# Patient Record
Sex: MALE | Race: WHITE | NOT HISPANIC OR LATINO | Employment: OTHER
[De-identification: names, ages, dates, MRNs, and addresses within clinical notes are randomized per-mention and may not be internally consistent; named-entity substitution may affect disease eponyms.]

---

## 2021-06-21 ENCOUNTER — TRANSCRIBE ORDERS (OUTPATIENT)
Dept: OTHER | Age: 71
End: 2021-06-21

## 2021-06-21 DIAGNOSIS — H02.839 DERMATOCHALASIS OF UNSPECIFIED EYE, UNSPECIFIED EYELID: Primary | ICD-10-CM

## 2021-06-23 ENCOUNTER — TELEPHONE (OUTPATIENT)
Dept: OPHTHALMOLOGY | Facility: CLINIC | Age: 71
End: 2021-06-23

## 2021-06-23 NOTE — TELEPHONE ENCOUNTER
Patient called regarding rescheduling need due to out of Town trip planned. Rescheduled patient accordingly and sent new appointment letter to patient.-Per Patient

## 2021-06-23 NOTE — TELEPHONE ENCOUNTER
"Spoke with patient regarding [facility] Ref[facility] to [facility]culoplastics for \"blepharoplasty each eye, visually significant dermatochalasis OU\" -Referred by Shantel Madrigal at [facility]   Phone: [phone] Fax: [phone]. Scheduled [facility] in [facility] accordingly and sent appointment letter to confirmed address.-Per Patient   "

## 2021-09-22 ENCOUNTER — OFFICE VISIT (OUTPATIENT)
Dept: OPHTHALMOLOGY | Facility: CLINIC | Age: 71
End: 2021-09-22
Attending: PHYSICIAN ASSISTANT
Payer: COMMERCIAL

## 2021-09-22 DIAGNOSIS — H02.834 DERMATOCHALASIS OF BOTH UPPER EYELIDS: Primary | ICD-10-CM

## 2021-09-22 DIAGNOSIS — H02.831 DERMATOCHALASIS OF BOTH UPPER EYELIDS: Primary | ICD-10-CM

## 2021-09-22 DIAGNOSIS — H57.813 BROW PTOSIS, BILATERAL: ICD-10-CM

## 2021-09-22 PROCEDURE — 99204 OFFICE O/P NEW MOD 45 MIN: CPT | Performed by: OPHTHALMOLOGY

## 2021-09-22 PROCEDURE — 92285 EXTERNAL OCULAR PHOTOGRAPHY: CPT | Performed by: OPHTHALMOLOGY

## 2021-09-22 PROCEDURE — 92081 LIMITED VISUAL FIELD XM: CPT | Performed by: OPHTHALMOLOGY

## 2021-09-22 RX ORDER — PREDNISONE 20 MG/1
20 TABLET ORAL DAILY PRN
COMMUNITY

## 2021-09-22 RX ORDER — LORATADINE 10 MG/1
10 TABLET ORAL EVERY MORNING
COMMUNITY

## 2021-09-22 RX ORDER — CETIRIZINE HYDROCHLORIDE 10 MG/1
10 TABLET ORAL EVERY MORNING
COMMUNITY

## 2021-09-22 RX ORDER — GABAPENTIN 300 MG/1
600 CAPSULE ORAL AT BEDTIME
COMMUNITY

## 2021-09-22 RX ORDER — LEVOTHYROXINE SODIUM 75 UG/1
75 TABLET ORAL EVERY MORNING
COMMUNITY

## 2021-09-22 RX ORDER — ATORVASTATIN CALCIUM 80 MG/1
40 TABLET, FILM COATED ORAL EVERY EVENING
COMMUNITY

## 2021-09-22 RX ORDER — ACETAMINOPHEN 325 MG/1
325-650 TABLET ORAL
COMMUNITY

## 2021-09-22 ASSESSMENT — EXTERNAL EXAM - RIGHT EYE: OD_EXAM: BROW PTOSIS, WITH FRONTALIS RELAXED, BROW IS BELOW SUPERIOR ORBITAL RIM AND LATERALLY INLINE WITH UPPER LASHES

## 2021-09-22 ASSESSMENT — VISUAL ACUITY
OD_CC: 20/15
CORRECTION_TYPE: GLASSES
METHOD: SNELLEN - LINEAR
OD_CC+: -1
OS_CC: 20/25

## 2021-09-22 ASSESSMENT — TONOMETRY
IOP_METHOD: ICARE
OD_IOP_MMHG: 17
OS_IOP_MMHG: 14

## 2021-09-22 ASSESSMENT — CONF VISUAL FIELD: COMMENTS: TANGENT VISUAL FIELD PERFORMED TODAY.

## 2021-09-22 ASSESSMENT — EXTERNAL EXAM - LEFT EYE: OS_EXAM: BROW PTOSIS, WITH FRONTALIS RELAXED, BROW IS BELOW SUPERIOR ORBITAL RIM AND LATERALLY INLINE WITH UPPER LASHES

## 2021-09-22 ASSESSMENT — SLIT LAMP EXAM - LIDS
COMMENTS: HEAVY DERMATOCHALASIS RESTING ON LASHES, TRUE PTOSIS
COMMENTS: HEAVY DERMATOCHALASIS RESTING ON LASHES, TRUE PTOSIS

## 2021-09-22 NOTE — NURSING NOTE
Chief Complaints and History of Present Illnesses   Patient presents with     Droopy Eye Lid Evaluation       Chief Complaint(s) and History of Present Illness(es)     Droopy Eye Lid Evaluation     Laterality: right upper lid and left upper lid    Associated signs and symptoms: Negative for eye pain and tearing              Comments     Patient referred by [facility] for dermatochalasis consultation. Patient states he has noticed for a while that his lids have been drooping, left eye worse than right eye. At times left eye droops so much it doesn't even seem that his eye is open. Patient needs to raise his brows up at times to improve vision.                     Cortes Garrido, Ophthalmic Assistant

## 2021-09-22 NOTE — LETTER
9/22/2021         RE: Flaco Hickman  [address] [place] [address]        Dear Colleague,    Thank you for referring your patient, Flaco Hickman, to the [facility]. Please see a copy of my visit note below.    Oculoplastic Clinic New Patient    Patient: Flaco Hickman MRN# [id number]   YOB: 1950 Age: 71 year old   Date of Visit: Sep 22, 2021    CC: Droopy eyelids obstructing vision.              HPI:     Chief Complaint(s) and History of Present Illness(es)     Droopy Eye Lid Evaluation     Laterality: right upper lid and left upper lid    Associated signs and symptoms: Negative for eye pain and tearing              Comments     [facility] dermat[facility]alasis   consultation. Patient states he has noticed for a while that his lids have   been drooping, left eye worse than right eye. At times left eye droops so   much it doesn't even seem that his eye is open. Patient needs to raise his   brows up at times to improve vision.              Flaco Hickman is a 71 year old male who has noted gradual onset of droopy eyelids over the past years. The droopy eyelid is interfering with activities of daily living including driving, and reading. The patient denies double vision, variability of the eyelid position, or dry eye symptoms.     EXAM:     MRD1: 1 mm both eyes   Dermatochalasis with excess skin touching eyelashes   Brow ptosis with brow resting below superior orbital rim   Mild true aponeurotic ptosis.     VISUAL FIELD:  Right eye untaped:30 degrees Right eye taped:50 degrees  Left eye untaped:25 degrees Left eye taped:50 degrees    Assessment & Plan     Flaco Hickman is a 71 year old male with the following diagnoses:   1. Dermatochalasis of both upper eyelids    2. Brow ptosis, bilateral         Both upper eyelid blepharoplasty, bilateral direct browplasty.    He lives about  2 hours away but ok coming back for suture removal 2 weeks postop.    Due to significant brow ptosis, blepharoplasty alone would be unsuccesfull in addressing the lateral hooding which is obstructing vision. Blepharoplasty alone would result in sewing very thin eyelid skin to thick sub-brow skin, and even with that, fail to address lateral hooding which is obstructing vision.     Has received Botox for headaches in the past, discussed shouldn't receive within 3 months of surgery to help with planning.     ANTICOAGULATION:    None         PHOTOS DEMONSTRATE:    Significant dermatochalasis with lids resting on eyelashes and obstructing visual axis  Blepharoptosis  Brow ptosis with thicker brow skin and hairs below the lateral superior orbital rim    Attending Physician Attestation: Complete documentation of historical and exam elements from today's encounter can be found in the full encounter summary report (not reduplicated in this progress note). I personally obtained the chief complaint(s) and history of present illness. I confirmed and edited as necessary the review of systems, past medical/surgical history, family history, social history, and examination findings as documented by others; and I examined the patient myself. I personally reviewed the relevant tests, images, and reports as documented above. I formulated and edited as necessary the assessment and plan and discussed the findings and management plan with the patient. Desirae Sanchez MD      Today with Flaco Hickman and his wife Shanel reviewed the indications, risks, benefits, and alternatives of the proposed surgical procedure including, but not limited to, failure obtain the desired result  and need for additional surgery, bleeding, infection, loss of vision, loss of the eye, and the remote possibility of permanent damage to any organ system or death with the use of anesthesia.  I provided multiple opportunities for the questions,  answered all questions to the best of my ability, and confirmed that my answers and my discussion were understood.             Again, thank you for allowing me to participate in the care of your patient.        Sincerely,        Desirae Sanchez MD    Oculoplastic and Orbital Surgery   Department of Ophthalmology and Visual Neurosciences  [facility]

## 2021-09-22 NOTE — PROGRESS NOTES
Oculoplastic Clinic New Patient    Patient: Flaco Hickman MRN# [id number]   YOB: 1950 Age: 71 year old   Date of Visit: Sep 22, 2021    CC: Droopy eyelids obstructing vision.              HPI:     Chief Complaint(s) and History of Present Illness(es)     Droopy Eye Lid Evaluation     Laterality: right upper lid and left upper lid    Associated signs and symptoms: Negative for eye pain and tearing              Comments     [facility]asis   consultation. Patient states he has noticed for a while that his lids have   been drooping, left eye worse than right eye. At times left eye droops so   much it doesn't even seem that his eye is open. Patient needs to raise his   brows up at times to improve vision.              Flaco Hickman is a 71 year old male who has noted gradual onset of droopy eyelids over the past years. The droopy eyelid is interfering with activities of daily living including driving, and reading. The patient denies double vision, variability of the eyelid position, or dry eye symptoms.     EXAM:     MRD1: 1 mm both eyes   Dermatochalasis with excess skin touching eyelashes   Brow ptosis with brow resting below superior orbital rim   Mild true aponeurotic ptosis.     VISUAL FIELD:  Right eye untaped:30 degrees Right eye taped:50 degrees  Left eye untaped:25 degrees Left eye taped:50 degrees    Assessment & Plan     Flaco Hickman is a 71 year old male with the following diagnoses:   1. Dermatochalasis of both upper eyelids    2. Brow ptosis, bilateral         Both upper eyelid blepharoplasty, bilateral direct browplasty.    He lives about 2 hours away but ok coming back for suture removal 2 weeks postop.    Due to significant brow ptosis, blepharoplasty alone would be unsuccesfull in addressing the lateral hooding which is obstructing vision. Blepharoplasty alone would result in sewing very thin eyelid  skin to thick sub-brow skin, and even with that, fail to address lateral hooding which is obstructing vision.     Has received Botox for headaches in the past, discussed shouldn't receive within 3 months of surgery to help with planning.     ANTICOAGULATION:    None         PHOTOS DEMONSTRATE:    Significant dermatochalasis with lids resting on eyelashes and obstructing visual axis  Blepharoptosis  Brow ptosis with thicker brow skin and hairs below the lateral superior orbital rim    Attending Physician Attestation: Complete documentation of historical and exam elements from today's encounter can be found in the full encounter summary report (not reduplicated in this progress note). I personally obtained the chief complaint(s) and history of present illness. I confirmed and edited as necessary the review of systems, past medical/surgical history, family history, social history, and examination findings as documented by others; and I examined the patient myself. I personally reviewed the relevant tests, images, and reports as documented above. I formulated and edited as necessary the assessment and plan and discussed the findings and management plan with the patient. Desirae Sanchez MD      Today with Flaco Hickman and his wife Shanel reviewed the indications, risks, benefits, and alternatives of the proposed surgical procedure including, but not limited to, failure obtain the desired result  and need for additional surgery, bleeding, infection, loss of vision, loss of the eye, and the remote possibility of permanent damage to any organ system or death with the use of anesthesia.  I provided multiple opportunities for the questions, answered all questions to the best of my ability, and confirmed that my answers and my discussion were understood.

## 2021-10-14 DIAGNOSIS — Z11.59 ENCOUNTER FOR SCREENING FOR OTHER VIRAL DISEASES: ICD-10-CM

## 2021-11-12 ENCOUNTER — ANESTHESIA EVENT (OUTPATIENT)
Dept: SURGERY | Facility: AMBULATORY SURGERY CENTER | Age: 71
End: 2021-11-12
Payer: COMMERCIAL

## 2021-11-15 ENCOUNTER — HOSPITAL ENCOUNTER (OUTPATIENT)
Facility: AMBULATORY SURGERY CENTER | Age: 71
End: 2021-11-15
Attending: OPHTHALMOLOGY | Admitting: OPHTHALMOLOGY
Payer: COMMERCIAL

## 2021-11-15 ENCOUNTER — ANESTHESIA (OUTPATIENT)
Dept: SURGERY | Facility: AMBULATORY SURGERY CENTER | Age: 71
End: 2021-11-15
Payer: COMMERCIAL

## 2021-11-15 VITALS
DIASTOLIC BLOOD PRESSURE: 76 MMHG | WEIGHT: 163 LBS | RESPIRATION RATE: 16 BRPM | OXYGEN SATURATION: 95 % | TEMPERATURE: 97.6 F | SYSTOLIC BLOOD PRESSURE: 151 MMHG

## 2021-11-15 DIAGNOSIS — H02.834 DERMATOCHALASIS OF BOTH UPPER EYELIDS: ICD-10-CM

## 2021-11-15 DIAGNOSIS — H57.813 BROW PTOSIS, BILATERAL: ICD-10-CM

## 2021-11-15 DIAGNOSIS — H02.831 DERMATOCHALASIS OF BOTH UPPER EYELIDS: ICD-10-CM

## 2021-11-15 PROCEDURE — 67900 REPAIR BROW DEFECT: CPT | Mod: RT

## 2021-11-15 PROCEDURE — G8907 PT DOC NO EVENTS ON DISCHARG: HCPCS

## 2021-11-15 PROCEDURE — G8918 PT W/O PREOP ORDER IV AB PRO: HCPCS

## 2021-11-15 PROCEDURE — 15823 BLEPHARP UPR EYELID XCSV SKN: CPT | Mod: 50 | Performed by: OPHTHALMOLOGY

## 2021-11-15 PROCEDURE — 15823 BLEPHARP UPR EYELID XCSV SKN: CPT | Mod: E1

## 2021-11-15 PROCEDURE — 67900 REPAIR BROW DEFECT: CPT | Mod: 50 | Performed by: OPHTHALMOLOGY

## 2021-11-15 RX ORDER — MEPERIDINE HYDROCHLORIDE 25 MG/ML
12.5 INJECTION INTRAMUSCULAR; INTRAVENOUS; SUBCUTANEOUS
Status: DISCONTINUED | OUTPATIENT
Start: 2021-11-15 | End: 2021-11-16 | Stop reason: HOSPADM

## 2021-11-15 RX ORDER — LIDOCAINE HYDROCHLORIDE 20 MG/ML
INJECTION, SOLUTION INFILTRATION; PERINEURAL PRN
Status: DISCONTINUED | OUTPATIENT
Start: 2021-11-15 | End: 2021-11-15

## 2021-11-15 RX ORDER — LIDOCAINE 40 MG/G
CREAM TOPICAL
Status: DISCONTINUED | OUTPATIENT
Start: 2021-11-15 | End: 2021-11-16 | Stop reason: HOSPADM

## 2021-11-15 RX ORDER — FENTANYL CITRATE 50 UG/ML
INJECTION, SOLUTION INTRAMUSCULAR; INTRAVENOUS PRN
Status: DISCONTINUED | OUTPATIENT
Start: 2021-11-15 | End: 2021-11-15

## 2021-11-15 RX ORDER — SODIUM CHLORIDE, SODIUM LACTATE, POTASSIUM CHLORIDE, CALCIUM CHLORIDE 600; 310; 30; 20 MG/100ML; MG/100ML; MG/100ML; MG/100ML
INJECTION, SOLUTION INTRAVENOUS CONTINUOUS
Status: DISCONTINUED | OUTPATIENT
Start: 2021-11-15 | End: 2021-11-16 | Stop reason: HOSPADM

## 2021-11-15 RX ORDER — TETRACAINE HYDROCHLORIDE 5 MG/ML
SOLUTION OPHTHALMIC PRN
Status: DISCONTINUED | OUTPATIENT
Start: 2021-11-15 | End: 2021-11-15 | Stop reason: HOSPADM

## 2021-11-15 RX ORDER — BACITRACIN ZINC 500 [USP'U]/G
OINTMENT TOPICAL
Qty: 30 G | Refills: 0 | Status: SHIPPED | OUTPATIENT
Start: 2021-11-15 | End: 2022-01-12

## 2021-11-15 RX ORDER — ONDANSETRON 2 MG/ML
4 INJECTION INTRAMUSCULAR; INTRAVENOUS EVERY 30 MIN PRN
Status: DISCONTINUED | OUTPATIENT
Start: 2021-11-15 | End: 2021-11-16 | Stop reason: HOSPADM

## 2021-11-15 RX ORDER — OXYCODONE HYDROCHLORIDE 5 MG/1
5 TABLET ORAL EVERY 4 HOURS PRN
Status: DISCONTINUED | OUTPATIENT
Start: 2021-11-15 | End: 2021-11-16 | Stop reason: HOSPADM

## 2021-11-15 RX ORDER — ERYTHROMYCIN 5 MG/G
OINTMENT OPHTHALMIC PRN
Status: DISCONTINUED | OUTPATIENT
Start: 2021-11-15 | End: 2021-11-15 | Stop reason: HOSPADM

## 2021-11-15 RX ORDER — FENTANYL CITRATE 50 UG/ML
25 INJECTION, SOLUTION INTRAMUSCULAR; INTRAVENOUS
Status: DISCONTINUED | OUTPATIENT
Start: 2021-11-15 | End: 2021-11-16 | Stop reason: HOSPADM

## 2021-11-15 RX ORDER — FENTANYL CITRATE 50 UG/ML
25 INJECTION, SOLUTION INTRAMUSCULAR; INTRAVENOUS EVERY 5 MIN PRN
Status: DISCONTINUED | OUTPATIENT
Start: 2021-11-15 | End: 2021-11-16 | Stop reason: HOSPADM

## 2021-11-15 RX ORDER — ERYTHROMYCIN 5 MG/G
OINTMENT OPHTHALMIC
Qty: 3.5 G | Refills: 0 | Status: SHIPPED | OUTPATIENT
Start: 2021-11-15 | End: 2022-01-12

## 2021-11-15 RX ORDER — ONDANSETRON 4 MG/1
4 TABLET, ORALLY DISINTEGRATING ORAL EVERY 30 MIN PRN
Status: DISCONTINUED | OUTPATIENT
Start: 2021-11-15 | End: 2021-11-16 | Stop reason: HOSPADM

## 2021-11-15 RX ORDER — ACETAMINOPHEN 325 MG/1
975 TABLET ORAL ONCE
Status: COMPLETED | OUTPATIENT
Start: 2021-11-15 | End: 2021-11-15

## 2021-11-15 RX ORDER — PROPOFOL 10 MG/ML
INJECTION, EMULSION INTRAVENOUS PRN
Status: DISCONTINUED | OUTPATIENT
Start: 2021-11-15 | End: 2021-11-15

## 2021-11-15 RX ADMIN — FENTANYL CITRATE 25 MCG: 50 INJECTION, SOLUTION INTRAMUSCULAR; INTRAVENOUS at 08:14

## 2021-11-15 RX ADMIN — LIDOCAINE HYDROCHLORIDE 60 MG: 20 INJECTION, SOLUTION INFILTRATION; PERINEURAL at 08:19

## 2021-11-15 RX ADMIN — SODIUM CHLORIDE, SODIUM LACTATE, POTASSIUM CHLORIDE, CALCIUM CHLORIDE: 600; 310; 30; 20 INJECTION, SOLUTION INTRAVENOUS at 07:02

## 2021-11-15 RX ADMIN — PROPOFOL 100 MG: 10 INJECTION, EMULSION INTRAVENOUS at 08:19

## 2021-11-15 RX ADMIN — ACETAMINOPHEN 975 MG: 325 TABLET ORAL at 07:00

## 2021-11-15 NOTE — OP NOTE
[facility] Brief Operative Note    Pre-operative diagnosis: Dermatochalasis of both upper eyelids [H02.831, H02.834]  Brow ptosis, bilateral [H57.813]   Post-operative diagnosis Same   Procedure: Procedure(s):  Both upper eyelid blepharoplasty and direct browplasty   Surgeon: Desirae Sanchez    Assistants(s):    Estimated blood loss: Less than 10 mL   Specimens: None   Findings: As expected

## 2021-11-15 NOTE — ANESTHESIA CARE TRANSFER NOTE
Patient: Flaco Hickman    Procedure: Procedure(s):  Both upper eyelid blepharoplasty and direct browplasty       Diagnosis: Dermatochalasis of both upper eyelids [H02.831, H02.834]  Brow ptosis, bilateral [H57.813]  Diagnosis Additional Information: No value filed.    Anesthesia Type:   MAC     Note:    Oropharynx: oropharynx clear of all foreign objects  Level of Consciousness: awake  Oxygen Supplementation: room air    Independent Airway: airway patency satisfactory and stable  Dentition: dentition unchanged  Vital Signs Stable: post-procedure vital signs reviewed and stable  Report to RN Given: handoff report given  Patient transferred to: Phase II  Comments: To Phase II. Report to RN.  VSS Resp status stable.  Handoff Report: Identifed the Patient, Identified the Reponsible Provider, Reviewed the pertinent medical history, Discussed the surgical course, Reviewed Intra-OP anesthesia mangement and issues during anesthesia, Set expectations for post-procedure period and Allowed opportunity for questions and acknowledgement of understanding      Vitals:  Vitals Value Taken Time   BP     Temp     Pulse     Resp     SpO2         Electronically Signed By: CHAVA Marrufo CRNA  November 15, 2021  9:08 AM

## 2021-11-15 NOTE — OP NOTE
Procedure Date: 11/15/2021    PREOPERATIVE DIAGNOSES:     1.  Both upper eyelid dermatochalasis.  2.  Bilateral brow ptosis.    POSTOPERATIVE DIAGNOSES:    1.  Both upper eyelid dermatochalasis.  2.  Bilateral brow ptosis.    PROCEDURE PERFORMED:      1.  Bilateral upper eyelid blepharoplasty, and bilateral brow ptosis repair.    SURGEON:  Desirae Sanchez M.D.    ASSISTANT:  None.    ANESTHESIA:  Monitored anesthesia care with local infiltration of 50:50 mixture of 2% lidocaine with epinephrine and 0.5% Marcaine.    COMPLICATIONS:  None.    ESTIMATED BLOOD LOSS:  3 mL.    INDICATIONS FOR PROCEDURE:  Mr. Dean presented with bilateral upper eyelid dermatochalasis and brow ptosis, resulting in interference with the superior visual field.  This was bothering him with his activities of daily living.  We discussed risks, benefits and alternatives of the proposed procedure and he elected to proceed.    DESCRIPTION OF PROCEDURE:  Mr. Daen was brought to the operating room and placed supine on the operating table.  The lateral extent of the blepharoplasty as well as the degree of brow ptosis repair desired was marked in the preoperative area.  The upper eyelid crease and the ellipse above the brow was marked out and infiltrated with local anesthetic as above.  He was prepped and draped in typical sterile fashion for oculoplastic surgery.  Attention was directed to the right side.  The brow incision was incised with a 15 blade and the forehead skin was excised with tenotomy scissors.  Care was taken to remain just in the subdermal plane.  Hemostasis was obtained with monopolar cautery.  Deep closure was achieved with Monocryl sutures and skin was closed with a running 6-0 dry sterile nylon suture.  Attention was directed to the other side where the same procedure was performed.  Attention was then directed to the right upper eyelid, skin was incised with a 15 blade and skin flap was excised with Emmanule  scissors.  Hemostasis was obtained with monopolar cautery.  The orbicularis oculi muscle and orbital septum was opened nasally and the nasal fat pad was prolapsed and conservatively debulked.  Hemostasis was obtained and the skin was closed with running 6-0 plain gut suture.  Attention was directed to the other side where the same procedure was performed.  He tolerated the procedure well.  Erythromycin ophthalmic ointment was applied and he left the operating room in good condition.    Desirae Sanchez MD        D: 11/15/2021   T: 11/15/2021   MT: MARY    Name:     ROSENDA MILLER  MRN:      [id number]        Account:        [id number]   :      1950           Procedure Date: 11/15/2021     Document: [id number]

## 2021-11-15 NOTE — DISCHARGE INSTRUCTIONS
[facility]   Adult Discharge Orders & Instructions     For 24 hours after surgery    1. Get plenty of rest.  A responsible adult must stay with you for at least 24 hours after you leave the hospital.   2. Do not drive or use heavy equipment.  If you have weakness or tingling, don't drive or use heavy equipment until this feeling goes away.  3. Do not drink alcohol.  4. Avoid strenuous or risky activities.  Ask for help when climbing stairs.   5. You may feel lightheaded.  IF so, sit for a few minutes before standing.  Have someone help you get up.   6. If you have nausea (feel sick to your stomach): Drink only clear liquids such as apple juice, ginger ale, broth or 7-Up.  Rest may also help.  Be sure to drink enough fluids.  Move to a regular diet as you feel able.  7. You may have a slight fever. Call the doctor if your fever is over 100 F (37.7 C) (taken under the tongue) or lasts longer than 24 hours.  8. You may have a dry mouth, a sore throat, muscle aches or trouble sleeping.  These should go away after 24 hours.  9. Do not make important or legal decisions.     Call your doctor for any of the followin.  Signs of infection (fever, growing tenderness at the surgery site, a large amount of drainage or bleeding, severe pain, foul-smelling drainage, redness, swelling).    2. It has been over 8 to 10 hours since surgery and you are still not able to urinate (pass water).    3.  Headache for over 24 hours.    4.  Numbness, tingling or weakness the day after surgery (if you had spinal anesthesia).                  5. Signs of Covid-19 infection (temperature over 100 degrees, shortness of breath, cough, loss of taste/smell, generalized body aches, persistent headache,                  chills, sore throat, nausea/vomiting/diarrhea).    To contact Dr Sanchez call:  [phone] - Day  [phone] - After Hours, ask for the Opthomologist on call    ________________________________________      Post-operative Instructions  Ophthalmic Plastic and Reconstructive Surgery    Desirae Sanchez M.D.     All instructions apply to the operated eye(s) or eyelid(s).    Wound care and personal care  ? Apply ice compresses 15 minutes of every hour while awake for 2 days. As long as there is no further bleeding, after two days, switch to warm water compresses for five minutes, four times a day until seen by your physician.   ? You may shower or wash your hair the day after surgery. Do not go swimming for at least 2 weeks to prevent contamination of your wounds.  ? You may go for walks and light activity is ok, but no heavy (over 15 pounds) lifting, bending or excessive straining for one week.   ? Do not apply make-up to the eyes or eyelids for 2 weeks after surgery.  ? Expect some swelling, bruising, black eye (even into the lower eyelids and cheeks). Also expect serum caking, crusting and discharge from the eye and/or incisions. A small amount of surface bleeding, and depending on the type of surgery, bleeding from the inside of the eyelid, is normal for the first 48 hours.  ? Avoid straining, bending at the waist, or lifting more than 15 pounds for 10 days. Activities that raise your blood pressure can worsen swelling, cause bleeding, and breaking of sutures. Like wise, sleeping with your head slightly elevated for the first several days can help swelling resolve more quickly.   ? Do continue to ambulate (walk) as you normally would - being sedentary after surgery can cause blood clots.   ? Your eye(s) and eyelid(s) may be painful and tender. This is normal after surgery.      Contact information and follow-up  ? Return to the Eye Clinic for a follow-up appointment with your physician as scheduled. If no appointment has been scheduled:   - [facility] eye clinic: [phone] for an appointment with Dr. Sanchez within 1-2 weeks from your date of surgery.       -  [facility] eye clinic: [phone] for an appointment with Dr. Sanchez within 1-2 weeks from your date of surgery.   ? Only if you are scheduled for a phone or video visit for your first postoperative appointment, please e-mail pictures to [email or web address] 1-2 days before your appointment. If your visit is in person, you do not need to email photos.     ? For severe pain, bleeding, or loss of vision, call the [facility] at [phone] or [facility] at [phone].     After hours or on weekends and holidays, call [phone] and ask to speak with the ophthalmologist on call.    An on call person can be reached after hours for concerns. The on call doctor should not call in medication refill requests after hours or on weekends, so please plan accordingly. An effort has been made to provide adequate pain medications following every surgery, and refills will not be provided in most instances.     Activity restrictions and driving  ? Avoid heavy lifting, bending, exercise or strenuous activity for 1 week after surgery.  You may resume other activities and return to work as tolerated.  ? You may not resume driving if you are using narcotic pain medications (such as Oxycodone, Norco, Percocet, Tylenol #3).    Medications  ? Restart all regular home medications and eye drops. If you take Plavix or  Aspirin on a regular basis, wait for 72 hours after your surgery before restarting these in order to decrease the risk of bleeding complications.  ? Avoid aspirin and aspirin-like medications (Motrin, Aleve, Ibuprofen, Kathy-Energy etc) for 72 hours to reduce the risk of bleeding. You may take Tylenol (acetaminophen) for pain.  ? In addition to your home medications, take the following post-operative medications as prescribed by your physician.    ? Apply antibiotic ointment to all sutures three times a day. The small tube (erythromycin) is for the eyelid,  and the larger tube is for the eyebrow.

## 2021-11-15 NOTE — ANESTHESIA POSTPROCEDURE EVALUATION
Patient: Flaco Hickman    Procedure: Procedure(s):  Both upper eyelid blepharoplasty and direct browplasty       Diagnosis:Dermatochalasis of both upper eyelids [H02.831, H02.834]  Brow ptosis, bilateral [H57.813]  Diagnosis Additional Information: No value filed.    Anesthesia Type:  MAC    Note:  Disposition: Outpatient   Postop Pain Control: Uneventful            Sign Out: Well controlled pain   PONV: No   Neuro/Psych: Uneventful            Sign Out: Acceptable/Baseline neuro status   Airway/Respiratory: Uneventful            Sign Out: Acceptable/Baseline resp. status   CV/Hemodynamics: Uneventful            Sign Out: Acceptable CV status   Other NRE: NONE   DID A NON-ROUTINE EVENT OCCUR? No           Last vitals:  Vitals Value Taken Time   /76 11/15/21 0923   Temp 36.4  C (97.6  F) 11/15/21 0908   Pulse     Resp 16 11/15/21 0923   SpO2 95 % 11/15/21 0923       Electronically Signed By: Ambrocio Conde MD  November 15, 2021  3:08 PM

## 2021-11-22 ENCOUNTER — ALLIED HEALTH/NURSE VISIT (OUTPATIENT)
Dept: NURSING | Facility: CLINIC | Age: 71
End: 2021-11-22

## 2021-11-22 DIAGNOSIS — H57.813 BROW PTOSIS, BILATERAL: Primary | ICD-10-CM

## 2021-11-22 PROCEDURE — 99207 PR NO CHARGE NURSE ONLY: CPT

## 2021-11-22 ASSESSMENT — PAIN SCALES - GENERAL: PAINLEVEL: NO PAIN (0)

## 2021-11-22 NOTE — NURSING NOTE
Pt here for suture removal from bilateral upper brows.  Wounds appear intact, no unexpected redness or drainage noted.  Sutures removed without difficulty, vaseline reapplied to suture lines.  Appointment scheduled for 1/2022, pt to call if questions or concerns arise.  Chacha Rosa RN

## 2022-01-12 ENCOUNTER — VIRTUAL VISIT (OUTPATIENT)
Dept: OPHTHALMOLOGY | Facility: CLINIC | Age: 72
End: 2022-01-12

## 2022-01-12 ENCOUNTER — TELEPHONE (OUTPATIENT)
Dept: OPHTHALMOLOGY | Facility: CLINIC | Age: 72
End: 2022-01-12

## 2022-01-12 DIAGNOSIS — H57.813 BROW PTOSIS, BILATERAL: ICD-10-CM

## 2022-01-12 DIAGNOSIS — H02.831 DERMATOCHALASIS OF BOTH UPPER EYELIDS: Primary | ICD-10-CM

## 2022-01-12 DIAGNOSIS — H02.834 DERMATOCHALASIS OF BOTH UPPER EYELIDS: Primary | ICD-10-CM

## 2022-01-12 PROCEDURE — 99024 POSTOP FOLLOW-UP VISIT: CPT | Performed by: OPHTHALMOLOGY

## 2022-01-12 NOTE — PROGRESS NOTES
Flaco Hickman is a 71 year old who is being evaluated via a billable telephone visit.        Assessment & Plan     Dermatochalasis of both upper eyelids  Brow ptosis, bilateral    Doing well. Very happy with improvement in peripheral vision.  F/u as needed.  He will try to email photos.     No follow-ups on file.    Desirae Sanchez MD  [facility] 71 year [facility] who presents for f/u 2 months post both upper eyelid blepharoplasty and direct brow. Doing well.       Objective           Vitals:  No vitals were obtained today due to virtual visit.    Physical Exam   healthy, alert and no distress  PSYCH: Alert and oriented times 3; coherent speech, normal   rate and volume, able to articulate logical thoughts, able   to abstract reason, no tangential thoughts, no hallucinations   or delusions  His affect is normal  RESP: No cough, no audible wheezing, able to talk in full sentences  Remainder of exam unable to be completed due to telephone visits    I performed the entire visit. Desirae Sanchez MD     Phone call duration: 3 minutes

## 2022-01-12 NOTE — TELEPHONE ENCOUNTER
Patient is trying to email pictures but it wont let him with the email provided. Please advise.Thank you.

## 2022-01-13 NOTE — TELEPHONE ENCOUNTER
Called and LM with my email address: [email or web address]      BELKIS Man, 8:58 AM 01/13/2022

## 2024-02-06 ENCOUNTER — TRANSFERRED RECORDS (OUTPATIENT)
Dept: HEALTH INFORMATION MANAGEMENT | Facility: CLINIC | Age: 74
End: 2024-02-06
Payer: MEDICARE

## 2024-02-07 ENCOUNTER — TRANSCRIBE ORDERS (OUTPATIENT)
Dept: OTHER | Age: 74
End: 2024-02-07

## 2024-02-07 DIAGNOSIS — K22.4 DYSKINESIA OF ESOPHAGUS: Primary | ICD-10-CM

## 2024-02-07 DIAGNOSIS — K44.9 HIATAL HERNIA: Primary | ICD-10-CM

## 2024-02-07 DIAGNOSIS — K22.4 ESOPHAGEAL DYSMOTILITY: ICD-10-CM

## 2024-02-08 ENCOUNTER — TELEPHONE (OUTPATIENT)
Dept: GASTROENTEROLOGY | Facility: CLINIC | Age: 74
End: 2024-02-08
Payer: MEDICARE

## 2024-02-08 NOTE — TELEPHONE ENCOUNTER
[facility] Call Center    Phone Message    May a detailed message be left on voicemail: No    Reason for Call: Other: Patient is currently scheduled on 3/19, as visit type New Esophageal Urgent. This is outside the expected timeline for this referral. Patient has been added to the waitlist.      Action Taken: Message routed to:  Other: GI REFERRAL TRIAGE POOL     Travel Screening: Not Applicable

## 2024-02-19 NOTE — TELEPHONE ENCOUNTER
The Pt is now scheduled within the appropriate time frame of one month for urgent triaged referrals. Therefore, no rescheduling is needed anymore. Closing encounter.

## 2024-02-27 ENCOUNTER — TRANSCRIBE ORDERS (OUTPATIENT)
Dept: OTHER | Age: 74
End: 2024-02-27

## 2024-02-27 DIAGNOSIS — K22.4 DYSKINESIA OF ESOPHAGUS: Primary | ICD-10-CM

## 2024-03-04 NOTE — TELEPHONE ENCOUNTER
Records Requested     March 4, 2024 12:22 PM  [id number]   Facility  [facility]   Fax: [phone]     [facility]  Fax: [phone]   Outcome Urgent request faxed to [facility] for records.   * 3/8/24 10:19 AM Records received from [facility] and scanned into the chart. - Velma    Urgent request faxed to [facility] to push images to PACS.     3/12/24 9:23 AM - Images resolved in PACS.        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 Gbehan   MEDICATION LIST Received         ENDOSCOPY  Received / CE  [facility]:  11/2/23, 11/24/21, 9/19/99, 7/9/97 - EGD   1/3/22 - Video Capsule Endoscopy    [facility]:  11/24/21 - EGD    COLONOSCOPY Care Everywhere [facility]:   11/24/21, 3/2/05,    PERTINENT LABS Received [facility] - 9/25/23 - CBCPD; CMP   PATHOLOGY REPORTS (RELATED) Care Everywhere 11/2/23 - EGD bx    IMAGING (CT, MRI, EGD, MRCP, Small Bowel Follow Through/SBT, MR/CT Enterography) PACS [facility]:    XR Upper GI and Esophagram - 1/8/24

## 2024-03-05 ENCOUNTER — TRANSFERRED RECORDS (OUTPATIENT)
Dept: HEALTH INFORMATION MANAGEMENT | Facility: CLINIC | Age: 74
End: 2024-03-05
Payer: MEDICARE

## 2024-03-19 ENCOUNTER — PRE VISIT (OUTPATIENT)
Dept: GASTROENTEROLOGY | Facility: CLINIC | Age: 74
End: 2024-03-19
Payer: MEDICARE

## 2024-03-19 ENCOUNTER — PATIENT OUTREACH (OUTPATIENT)
Dept: ONCOLOGY | Facility: CLINIC | Age: 74
End: 2024-03-19
Payer: MEDICARE

## 2024-03-19 ENCOUNTER — VIRTUAL VISIT (OUTPATIENT)
Dept: GASTROENTEROLOGY | Facility: CLINIC | Age: 74
End: 2024-03-19
Attending: GENERAL PRACTICE
Payer: COMMERCIAL

## 2024-03-19 VITALS — HEIGHT: 68 IN | BODY MASS INDEX: 24.25 KG/M2 | WEIGHT: 160 LBS

## 2024-03-19 DIAGNOSIS — K22.4 ESOPHAGEAL DYSMOTILITY: ICD-10-CM

## 2024-03-19 DIAGNOSIS — K44.9 HIATAL HERNIA: ICD-10-CM

## 2024-03-19 DIAGNOSIS — K44.9 HIATAL HERNIA: Primary | ICD-10-CM

## 2024-03-19 DIAGNOSIS — R13.19 ESOPHAGEAL DYSPHAGIA: Primary | ICD-10-CM

## 2024-03-19 PROCEDURE — 99203 OFFICE O/P NEW LOW 30 MIN: CPT | Mod: 95 | Performed by: PHYSICIAN ASSISTANT

## 2024-03-19 ASSESSMENT — PAIN SCALES - GENERAL: PAINLEVEL: NO PAIN (0)

## 2024-03-19 NOTE — PROGRESS NOTES
"Virtual Visit Details    Type of service:  Video Visit     Originating Location (pt. Location): Home    Distant Location (provider location):  Off-site  Platform used for Video Visit: [facility]     Gastroenterology Visit for: Flaco Hickman 1950   MRN: [id number]     Reason for Visit:  chief complaint    Referred by: Hayden  / [facility] [address] / [place]*  Patient Care Team:  No Ref-Primary, Physician as PCP - General    History of Present Illness:   Flaco Hickman is 73 year old male with significant past medical history pertinent for HTN, hypothyroidism who is presenting as a new patient in consultation at the request of Dr. Blake with a chief complaint of dysphagia.    With prior remote diagnosis of \"Nutcracker Esophagus\" from [facility]. He was subsequently prescribed Reglan and Nitroglycerin.     When asked to describe his symptoms today Cristobal states \"when I eat it will get stuck and then I will have to vomit.\" This is predominately with solids with intermittent dysphagia to semisolids, liquids and pills. Onset was approximately 1 year prior and symptoms have been progressive. Now dysphagia occurring every other day if not every day. When this occurs Cristobal has to stop eating and regurgitate/emesis the previously consumed food particles.     Denies classic symptoms of reflux including heartburn and regurgitation.    Denies weight loss, nausea, odynophagia, dysphonia/hoarseness, heartburn, regurgitation, abdominal pain, early satiation, early satiety, diarrhea, constipation (< 3 stools per week), incontinence of feces, melena, hematochezia and BRBR.     Father had colon cancer diagnosed in early 70s.    No additional family history or GI related malignancy (esophageal, gastric, pancreatic, liver or colon).       Esophageal Questionnaire(s)    BEDQ Questionnaire      3/16/2024     9:34 PM   BEDQ Questionnaire: How Often Have You Had the Following?   Trouble " eating solid food (meat, bread, vegetables) 4   Trouble eating soft foods (yogurt, jello, pudding) 1   Trouble swallowing liquids 2   Pain while swallowing 2   Coughing or choking while swallowing foods or liquids 4   Total Score: 13         3/16/2024     9:34 PM   BEDQ Questionnaire: Discomfort/Pain Ratings   Eating solid food (meat, bread, vegetables) 4   Eating soft foods (yogurt, jello, pudding) 2   Drinking liquid 3   Total Score: 9       Eckardt Questionnaire      3/16/2024     9:37 PM   Eckardt Questionnaire   Dysphagia 1   Regurgitation 1   Retrosternal Pain 0   Weight Loss (kg) 0   Total Score:  2       Promis 10 Questionnaire      3/16/2024     9:40 PM   PROMIS 10 FLOWSHEET DATA   In general, would you say your health is: 2   In general, would you say your quality of life is: 2   In general, how would you rate your physical health? 2   In general, how would you rate your mental health, including your mood and your ability to think? 2   In general, how would you rate your satisfaction with your social activities and relationships? 2   In general, please rate how well you carry out your usual social activities and roles. (This includes activities at home, at work and in your community, and responsibilities as a parent, child, spouse, employee, friend, etc.) 3   To what extent are you able to carry out your everyday physical activities such as walking, climbing stairs, carrying groceries, or moving a chair? 4   In the past 7 days, how often have you been bothered by emotional problems such as feeling anxious, depressed, or irritable? 4   In the past 7 days, how would you rate your fatigue on average? 2   In the past 7 days, how would you rate your pain on average, where 0 means no pain, and 10 means worst imaginable pain? 5   Mental health question re-calculation - no clinical value 2   Physical health question re-calculation - no clinical value 4   Pain question re-calculation - no clinical value 3   Global  Mental Health Score 8   Global Physical Health Score 13   PROMIS TOTAL - SUBSCORES 21       STUDIES & PROCEDURES:    EGD:     11/2023 [facility]  Findings:        hiatal hernia : hill grade 4 , 38 cm hiatus measure at        Z line 31 cm        GE junction : cold forcep biopsy x 4        Antral biopsy: cold forcep biopsy x 2        Evidence of chronic gastritis in stomach   Impression:            - specimens collected.     Recommendation:        - Await pathology results. Will order upper GI for                          evaluation of dysphagia     Final Diagnosis     A. ESOPHAGOGASTRIC JUNCTION, BIOPSY  --SQUAMOUS AND GASTRIC MUCOSA WITH CHANGES CONSISTENT WITH REFLUX  --DETACHED FRAGMENT OF SQUAMOUS EPITHELIUM WITH FUNGAL FORMS CONSISTENT WITH CANDIDA SPECIES  --SEE COMMENT     B. STOMACH, ANTRUM, BIOPSY  --BODY-TYPE GASTRIC MUCOSA WITH FOCAL CHRONIC INFLAMMATION  --NO HELICOBACTER PYLORI IDENTIFIED       Electronically signed by Kari Olvera MD on 11/6/2023 at  1:16 PM   Comment     Given the detached nature of the squamous epithelium, the possibility of oropharyngeal contamination cannot be excluded       11/2021  Component 2 yr ago   Final Diagnosis     A. GASTRIC MUCOSA, BIOPSY   --BENIGN GASTRIC MUCOSA WITH FEATURES OF REACTIVE GASTROPATHY   --NEGATIVE FOR HELICOBACTER ORGANISMS BY IMMUNOHISTOCHEMICAL STAIN   --NO EVIDENCE OF DYSPLASIA OR MALIGNANCY      B. GASTRIC FUNDIC POLYP, BIOPSY   --BENIGN FUNDIC GLAND POLYP  --NO EVIDENCE OF DYSPLASIA OR MALIGNANCY         Colonoscopy:    11/2021 [facility]   Findings:        The perianal and digital rectal examinations were normal. Pertinent        negatives include normal sphincter tone, no palpable rectal lesions and        no anal lesion or abnormality.        External and internal hemorrhoids were found during retroflexion, during        perianal exam, during digital exam and during endoscopy. The hemorrhoids        were  medium-sized.        A few small-mouthed diverticula were found in the sigmoid colon.        The terminal ileum appeared normal.     Impression:            - External and internal hemorrhoids.                          - Diverticulosis in the sigmoid colon.                          - The examined portion of the ileum was normal.                          - No specimens collected.     Colorectal cancer in father, Last colonoscopy: FIve                          years ago at the [facility].       EndoFLIP directed at the UES or LES (8cm (EF-325) balloon length or 16cm (EF-322) balloon length):   Date:  8cm balloon  Balloon inflation Balloon pressure CSA (mm^2) DI (mm^2/mmHg) Dmin (mm) Compliance   20 (ladmark ID)        30        40        50           16cm balloon  Balloon inflation Balloon pressure CSA (mm^2) DI (mm^2/mmHg) Dmin (mm) Compliance   30 (ladmark ID)        40        50        60        70           High Resolution Manometry:    PH/Impedance:     Bravo:    CT:    Esophagram:    UGI and Esophagram   FINDINGS:   Following administration of effervescent crystals, barium was given.  The no   discrete mucosal abnormality along the course of the esophagus.  There is   moderate esophageal dysmotility showing tertiary contractions and proximal   escape with stasis.  Moderate-sized hiatal hernia.  Duodenum and proximal   jejunum appear normal.  No visible gastroesophageal reflux elicited.  Barium   tablet passes through the gastroesophageal junction without delay.   image   shows multilevel spondylosis and degenerative curvature of the spine.     IMPRESSION:   1. Moderate-sized hiatal hernia.   2. Normal appearance of the proximal small bowel.   3. Moderate esophageal dysmotility with tertiary contractions, proximal escape,   and stasis.      FL VSS:     GES:    U/S:     XRAY:    Other:       Prior medical records were reviewed including, but not limited to, notes from referring providers, lab work, radiographic  tests, and other diagnostic tests. Pertinent results were summarized above.     History     Past Medical History:   Diagnosis Date     Allergies      Depression      Hypercholesteremia      Hypothyroidism      Nerve pain     right hand       Past Surgical History:   Procedure Laterality Date     BLEPHAROPLASTY, BROW LIFT, COMBINED Bilateral 11/15/2021    Procedure: Both upper eyelid blepharoplasty and direct browplasty;  Surgeon: Desirae Sanchez MD;  Location:  OR       Social History     Socioeconomic History     Marital status:      Spouse name: Not on file     Number of children: Not on file     Years of education: Not on file     Highest education level: Not on file   Occupational History     Not on file   Tobacco Use     Smoking status: Former     Smokeless tobacco: Never   Substance and Sexual Activity     Alcohol use: Not on file     Drug use: Not on file     Sexual activity: Not on file   Other Topics Concern     Not on file   Social History Narrative     Not on file     Social Determinants of Health     Financial Resource Strain: Not on file   Food Insecurity: Not on file   Transportation Needs: Not on file   Physical Activity: Not on file   Stress: Not on file   Social Connections: Not on file   Interpersonal Safety: Not on file   Housing Stability: Not on file       No family history on file.  Family history reviewed and edited as appropriate    Medications and Allergies:     Outpatient Encounter Medications as of 3/19/2024   Medication Sig Dispense Refill     acetaminophen (TYLENOL) 325 MG tablet Take 325-650 mg by mouth       atorvastatin (LIPITOR) 80 MG tablet Take 40 mg by mouth       cetirizine (ZYRTEC) 10 MG tablet Take 10 mg by mouth       cholecalciferol (VITAMIN D3) 25 mcg (1000 units) capsule Take 1 capsule by mouth       gabapentin (NEURONTIN) 300 MG capsule Take 600 mg by mouth       hypromellose-dextran (ARTIFICAL TEARS) 0.1-0.3 % ophthalmic solution Apply 1 drop to eye        "levothyroxine (SYNTHROID/LEVOTHROID) 75 MCG tablet Take 75 mcg by mouth       loratadine (CLARITIN) 10 MG tablet Take 10 mg by mouth       omeprazole (PRILOSEC) 20 MG DR capsule Take 20 mg by mouth       predniSONE (DELTASONE) 20 MG tablet Take 20 mg by mouth       No facility-administered encounter medications on file as of 3/19/2024.        Allergies   Allergen Reactions     Aspirin Nausea and Vomiting     Nsaids Other (See Comments)     Causes ulcers     Diclofenac      Other reaction(s): Unknown Reaction     Latex Other (See Comments)     HIVES        Review of systems:  A full 10 point review of systems was obtained and was negative except for the pertinent positives and negatives stated within the HPI.    Objective Findings:   Physical Exam:    Constitutional: Ht 1.727 m (5' 8\")   Wt 72.6 kg (160 lb)   BMI 24.33 kg/m    General: Alert, cooperative, no distress, well-appearing  Head: Atraumatic, normocephalic, no obvious abnormalities   Eyes: Sclera anicteric, no obvious conjunctival hemorrhage   Nose: Nares normal, no obvious malformation, no obvious rhinorrhea   Respiratory: Resting comfortably, no apparent distress, no cough.   Skin: No jaundice, no obvious rash  Neurologic: AAOx3, no obvious neurologic abnormality  Psychiatric: Normal Affect, appropriate mood  Extremities: No obvious edema, no obvious malformation     Labs, Radiology, Pathology     No results found for: \"WBC\", \"HGB\", \"PLT\", \"CHOL\", \"TRIG\", \"HDL\", \"LDLDIRECT\", \"ALT\", \"AST\", \"NA\", \"CREATININE\", \"BUN\", \"CO2\", \"TSH\", \"INR\", \"GLUF\"     Liver Function Studies - No results for input(s): \"PROTTOTAL\", \"ALBUMIN\", \"BILITOTAL\", \"ALKPHOS\", \"AST\", \"ALT\", \"BILIDIRECT\" in the last 52496 hours.       Patient Active Problem List    Diagnosis Date Noted     Dermatochalasis of both upper eyelids 09/22/2021     Priority: Medium     Added automatically from request for surgery [id number]       Brow ptosis, bilateral 09/22/2021     Priority: Medium     Added " "automatically from request for surgery [id number]        Assessment and Plan   Assessment/Plan:    Flaco Hickman is 73 year old male with significant past medical history pertinent for HTN, hypothyroidism who is presenting as a new patient in consultation at the request of Dr. Blake with a chief complaint of dysphagia.    #Dysphagia - Esophageal   #Hiatal Hernia - Large 7 cm   #History of \"Nutcracker esophagus\"  Today Cristobal presents with 1 year history of progressive dysphagia now occurring every other day if not daily.  Predominant the dysphagia is to solids with intermittent difficulties with semisolid, liquids and pills. He denies classic symptoms of reflux including both heartburn and regurgitation with the use of omeprazole 20 mg once daily.  Prior evaluation includes upper endoscopy performed through [facility] that was notable for a large 7 cm hiatal hernia.  Subsequent upper GI series and esophagram was notable for the moderate size hiatal hernia as well as esophageal dysmotility with what was described as proximal escape and stasis.  Additionally, Cristobal has remote diagnosis of nutcracker esophagus from the [facility] in the mid 1990s. Differential includes dysphagia secondary to the large hiatal hernia vs esophageal dysmotility.    - Consultation to the thoracic surgery team   - High resolution manometry study   - Continue Omeprazole 20mg which is best taken on empty stomach 30 to 60 minutes prior to the first meal of the day  - Reflux friendly lifestyle modifications as directed within the AVS  - Attempt to not eat or drink 3-4 hours prior to bed       #Colorectal Cancer Screening   Colonoscopy 2021 that was unremarkable. Family history pertient for father with CRC. Per the most recent update by the [place] Multi-Society Task Force on Colorectal Cancer recall should be 5 years, 2026 or sooner if otherwise indicated.     Follow up plan:   Return to clinic 4 months and as " needed.    The risks and benefits of my recommendations, as well as other treatment options were discussed with the patient and any available family today. All questions were answered.     o Follow up: As planned above. Today, I personally spent 22 minutes in direct face to face time with the patient, of which greater than 50% of the time was spent in patient education and counseling as described above. Approximately 11 minutes were spent on indirect care associated with the patient's consultation including but not limited to review of: patient medical records to date, clinic visits, hospital records, lab results, imaging studies, procedural documentation, and coordinating care with other providers. The findings from this review are summarized in the above note. All of the above accounted for a cumulative time of 33 minutes and was performed on the date of service.     The patient verbalized understanding of the plan and was appreciative for the time spent and information provided during the office visit.           Macey Hui PA-C  Division of Gastroenterology, Hepatology, and Nutrition  [facility]       Documentation assisted by voice recognition and documentation system.

## 2024-03-19 NOTE — PROGRESS NOTES
New Patient Oncology Nurse Navigator Note     Referring provider: Macey Hui PA-C    Referring Clinic/Organization: [facility]  Referred to: Thoracic Surgery  Requested provider (if applicable): Dr. Davidson  Referral Received: 03/19/24       Evaluation for :   Diagnosis   K44.9 (ICD-10-CM) - Hiatal hernia   K22.4 (ICD-10-CM) - Esophageal dysmotility   R13.19 (ICD-10-CM) - Esophageal dysphagia     Clinical History (per Nurse review of records provided):      11/02/2023 EGD (Care Everywhere) showed:   Findings:       hiatal hernia : hill grade 4 , 38 cm hiatus measure at        Z line 31 cm        GE junction : cold forcep biopsy x 4        Antral biopsy: cold forcep biopsy x 2        Evidence of chronic gastritis in stomach   Impression:            - specimens collected.     01/09/2024 XR Upper GI and Esophagram (care everywhere) showed:   IMPRESSION:   1. Moderate-sized hiatal hernia.   2. Normal appearance of the proximal small bowel.   3. Moderate esophageal dysmotility with tertiary contractions, proximal escape,   and stasis.     Clinical Assessment / Barriers to Care (Per Nurse):  Social History  Tobacco Use Types Packs/Day Years Used Date   Smoking Tobacco: Former Cigarettes 1 50 Quit: 03/29/2018     Records Location: [facility]   Records Needed: None  Additional testing needed prior to consult: CT chest    CATY Bernardo, RN, OCN  [facility] Oncology Nurse Navigator  [phone] / [phone]

## 2024-03-19 NOTE — NURSING NOTE
Is the patient currently in the state of [place]? YES    Visit mode:VIDEO    If the visit is dropped, the patient can be reconnected by: VIDEO VISIT: Text to cell phone:   Telephone Information:   Mobile [phone]       Will anyone else be joining the visit? NO  (If patient encounters technical issues they should call [phone]    How would you like to obtain your AVS? MyChart    Are changes needed to the allergy or medication list? No    Reason for visit: Video Visit (Consult)    Ghada PORTILLO

## 2024-03-19 NOTE — PATIENT INSTRUCTIONS
It was a pleasure taking care of you today.  I've included a brief summary of our discussion and care plan from today's visit below.  Please review this information with your primary care provider.  _______________________________________________________________________    My recommendations are summarized as follows:    - Consultation to the thoracic surgery team   - High resolution manometry study   - Continue Omeprazole 20mg which is best taken on empty stomach 30 to 60 minutes prior to the first meal of the day  - Reflux friendly lifestyle modifications as directed within the AVS  - Attempt to not eat or drink 3-4 hours prior to bed       Gastroesophageal Reflux Disease (GERD) Lifestyle Modifications:   If taking acid suppression therapy (PPI ie Pantoprazole, Lansoprazole, Omeprazole, Esomeprazole, Rabeprazole, Dexlansoprazole) it should be taken 30 - 60 minutes prior to meals on an empty stomach to have maximum effect  Avoid triggers for reflux such as coffee, chocolate, carbonated beverages, spicy foods, acidic foods (tomato based/citrus and foods with high fat content   Abstinence from alcohol and cessation of all tobacco products is recommended   Studies have shown that weight loss, exercise and maintaining a healthy BMI significantly reduce GERD symptoms   Remain upright while eating and immediately after meals  Do not eat or drink at least 3 hours prior to laying down supine/laying down for bed   Avoid late night/middle of the night snacking    Consider obtaining a wedge pillow or elevating the head end of the bed while sleeping   Avoid sleeping right side down as this can place the lower part of the esophagus/lower esophageal sphincter in a dependent position that favors reflux   Attempting to eat smaller more frequent meals may improve symptoms       To schedule endoscopic procedures you may call: [phone]  To schedule radiology (imaging) tests you may call: [phone]  To schedule an ENT  appointment you may call: [phone]    Please call my nurse Lauryn ([phone]), Marie ([phone]) with any questions or concerns.      Return to GI Clinic in 4 months to review your progress.    _______________________________________________________________________    Who do I call with any questions after my visit?  Please be in touch if there are any further questions that arise following today's visit.  There are multiple ways to contact your gastroenterology care team.      During business hours, you may reach a Gastroenterology nurse at [phone] and choose option 3.       To schedule or reschedule an appointment, please call [phone].     You can always send a secure message through AwayFind.  AwayFind messages are answered by your nurse or doctor typically within 24 hours.  Please allow extra time on weekends and holidays.      For urgent/emergent questions after business hours, you may reach the on-call GI Fellow by contacting the [facility]  at [phone].     How will I get the results of any tests ordered?    You will receive all of your results.  If you have signed up for [employer], any tests ordered at your visit will be available to you after your physician reviews them.  Typically this takes 1-2 weeks.  If there are urgent results that require a change in your care plan, your physician or nurse will call you to discuss the next steps.      What is AwayFind?  AwayFind is a secure way for you to access all of your healthcare records from the [facility].  It is a web based computer program, so you can sign on to it from any location.  It also allows you to send secure messages to your care team.  I recommend signing up for AwayFind access if you have not already done so and are comfortable with using a computer.      How to I schedule a follow-up visit?  If you did not schedule a follow-up visit today, please call [phone] to schedule a follow-up office  visit.      If you feel you received exceptional care and are interested in supporting the clinical and research goals of Macey Hui PA-C or the Division of Gastroenterology, Hepatology, and Nutrition please contact [email or web address] from the [facility] to discuss opportunities to donate.    Sincerely,    Macey Hui PA-C  Division of Gastroenterology, Hepatology, and Nutrition  [facility]

## 2024-03-21 ENCOUNTER — TELEPHONE (OUTPATIENT)
Dept: GASTROENTEROLOGY | Facility: CLINIC | Age: 74
End: 2024-03-21
Payer: MEDICARE

## 2024-03-21 NOTE — TELEPHONE ENCOUNTER
Left Voicemail (1st Attempt) for the patient to call back and schedule the following:    Appointment type: return ESO   Provider: Macey Hui  Return date: 7/19/2024  Specialty phone number: [phone]  Additional appointment(s) needed:   Additonal Notes:

## 2024-04-12 NOTE — TELEPHONE ENCOUNTER
RECORDS STATUS - ALL OTHER DIAGNOSIS      RECORDS RECEIVED [facility]: [facility]   DATE RECEIVED:    NOTES STATUS DETAILS   OFFICE NOTE from referring provider Epic 3/19/24: Macey Hui PA-C   OPERATIVE REPORT [facility] 11/2/23: EGD   MEDICATION LIST [facility]    LABS     PATHOLOGY REPORTS Report in [facility] 11/2/23: [id number] (positive)   ANYTHING RELATED TO DIAGNOSIS External: [facility] Most recent 9/25/23   IMAGING (NEED IMAGES & REPORT)     CT PACS 4/16/24: CT Chest   XRAYS PACS 1/8/24: XR Upper GI & Esohagram

## 2024-04-16 ENCOUNTER — ANCILLARY PROCEDURE (OUTPATIENT)
Dept: CT IMAGING | Facility: CLINIC | Age: 74
End: 2024-04-16
Attending: CLINICAL NURSE SPECIALIST
Payer: COMMERCIAL

## 2024-04-16 ENCOUNTER — TELEPHONE (OUTPATIENT)
Dept: GASTROENTEROLOGY | Facility: CLINIC | Age: 74
End: 2024-04-16
Payer: MEDICARE

## 2024-04-16 DIAGNOSIS — K44.9 HIATAL HERNIA: ICD-10-CM

## 2024-04-16 PROCEDURE — G1010 CDSM STANSON: HCPCS | Mod: GC | Performed by: RADIOLOGY

## 2024-04-16 PROCEDURE — 71250 CT THORAX DX C-: CPT | Mod: MG | Performed by: RADIOLOGY

## 2024-04-16 NOTE — TELEPHONE ENCOUNTER
Non-Invasive GI Procedure Scheduling Questionnaire    Have you had a positive Covid test in the last 14 days?  No    Are you active on [employer]?   MAIL    What insurance is in the chart?  Other:  [employer] [place]    Ordering/Referring Provider:     [facility]      (If ordering provider performs procedure, schedule with ordering provider unless otherwise instructed. )    (Females) Are you currently pregnant?     Have you ever had or are you awaiting a heart or lung transplant? No - Schedule next available.    Do you need assistance transferring? No - Continue scheduling.    Do you take acid reflux medication or proton-pump inhibitors (PPI)? Yes - Advise patients to discontinue acid suppression medications 2-4 weeks prior to the exam/procedure. ( Scheduled for more than 14 days out.)    Patient Reminders:  Please inform patients to review instructions sent via [employer] or letter two weeks before procedure.  The Manometry exam may take up to 90 minutes.  The Breath Test exam may take up to 4 hours.

## 2024-04-22 ENCOUNTER — PATIENT OUTREACH (OUTPATIENT)
Dept: GASTROENTEROLOGY | Facility: CLINIC | Age: 74
End: 2024-04-22
Payer: MEDICARE

## 2024-04-22 NOTE — PROGRESS NOTES
THORACIC SURGERY - NEW PATIENT OFFICE VISIT      Dear JASPAL Hui,    I saw Flaco Hickman at your request in consultation for the evaluation and treatment of a hiatal hernia.     HPI  Flaco Hickman is a 74 year old male with dysphagia to solids and intermittently with liquids and pills. He takes about 1/2 hour to eat. He regurgitates solids and liquids. This has been going on for about one to two years.  He started with heartburn in his early 30's. His weight is stable. He is on omeprazole once daily and has no heartburn or nocturnal symptoms.    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 He was placed on medication.     Previsit Tests   CT chest (04/16/2024): Moderate to large hiatal hernia. Focal bony expansion of the lateral 6th and 7th ribs, without evidence of cortical destruction thought to be benign .      Esophagram (01/08/2024): Moderate-sized hiatal hernia. Moderate esophageal dysmotility with tertiary contractions, proximal escape and stasis.     EGD 11/20/2023: Hiatal hernia, GE flap valve Hill grade 4, chronic gastritis, GE junction changes consistent with reflux, candida    PMH  Reviewed, as below    Past Medical History:   Diagnosis Date    Allergies     Depression     Hypercholesteremia     Hypothyroidism     Nerve pain     right hand   Right shoulder melanoma  Migraine s/p botox injections    PSH  Reviewed, as below    Past Surgical History:   Procedure Laterality Date    BLEPHAROPLASTY, BROW LIFT, COMBINED Bilateral 11/15/2021    Procedure: Both upper eyelid blepharoplasty and direct browplasty;  Surgeon: Desirae Sanchez MD;  Location: [facility] OR    Soft tissue excision (03/2024)    ETOH:  None  TOB:  ages 10 - 64, 1 pack per day. Quit 10 years ago      Physical examination  BP (!) 144/86 (BP Location: Right arm, Patient Position: Chair, Cuff Size: Adult Large)   Pulse 67   Temp 98.5  F (36.9  C) (Oral)   Resp 16   " Ht 1.649 m (5' 4.92\")   Wt 76.4 kg (168 lb 8 oz)   SpO2 95%   BMI 28.11 kg/m     Physical Exam  Constitutional:       Appearance: Normal appearance. He is normal weight.   Eyes:      Conjunctiva/sclera: Conjunctivae normal.   Cardiovascular:      Rate and Rhythm: Normal rate.   Pulmonary:      Effort: Pulmonary effort is normal.   Abdominal:      General: Abdomen is flat.   Skin:     General: Skin is warm and dry.   Neurological:      Mental Status: He is alert and oriented to person, place, and time.   Psychiatric:         Mood and Affect: Mood normal.         Behavior: Behavior normal.         Thought Content: Thought content normal.         Judgment: Judgment normal.          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 He is here with his wife. They have two children and six grandchildren.       Code Status: Full Code    IMPRESSION (K44.9,  K21.9) Hiatal hernia with gastroesophageal reflux    (K22.4) Esophageal dysmotility    (R13.19) Esophageal dysphagia      This person is a 74 year old male with a symptomatic large hiatal hernia. He is a good surgical candidate pending the result of the manometry. I think his symptoms are attributable to the hernia and not nutcracker esophagus.     PLAN  I spent 30 min on the date of the encounter in chart review, patient visit, review of tests, documentation and/or discussion with other providers about the issues documented above. I reviewed the plan as follows:    Procedure planned: Robotic-assisted laparoscopic hiatal hernia repair, possible gastroplasty, possible gastrostomy, fundoplication    The risks include, but are not limited to the following.  There is a risk of injury to the stomach, esophagus or abdominal contents.  There is a risk of injury to the vagus nerves, which could result in functional emptying issues for the stomach.  There is a risk of problems with the wrap, which can include difficulty swallowing, persistent " "reflux and pain.  Coughing or retching/vomiting in the weeks after surgery can result in breakdown of the repair.  If the esophagus does not reach the abdominal cavity, a \"new\" esophagus will be made out of the top of the stomach.  This gastroplasty can dilate over time, causing new swallowing difficulties, and continues to produce acid.  The wrap may loosen over time and need to be revised with a new operation.  There is a risk of postoperative swelling from air under the skin, causing your face and chest to appear swollen. This is not dangerous and will reabsorb over time. The lung cavities may be entered and tubes placed to evacuate air and fluid.   Finally, there is a risk of death.       Necessary Preop Tests & Appointments: Preoperative assessment clinic    Regional Anesthesia Plan: None    Anticoagulation Plan: Prophylactic Lovenox      I appreciate the opportunity to participate in the care of your patient and will keep you updated.      Sincerely,    Darren Davidson MD   "

## 2024-04-22 NOTE — TELEPHONE ENCOUNTER
Returned call to pt's spouse to clarify that pt is getting manometry only done at May appt.  Left detailed vm.    My recommendations are summarized as follows:     - Consultation to the thoracic surgery team   - High resolution manometry study   - Continue Omeprazole 20mg which is best taken on empty stomach 30 to 60 minutes prior to the first meal of the day  - Reflux friendly lifestyle modifications as directed within the AVS  - Attempt to not eat or drink 3-4 hours prior to bed

## 2024-04-23 ENCOUNTER — PRE VISIT (OUTPATIENT)
Dept: ONCOLOGY | Facility: CLINIC | Age: 74
End: 2024-04-23
Payer: MEDICARE

## 2024-04-23 ENCOUNTER — PREP FOR PROCEDURE (OUTPATIENT)
Dept: SURGERY | Facility: CLINIC | Age: 74
End: 2024-04-23

## 2024-04-23 ENCOUNTER — ONCOLOGY VISIT (OUTPATIENT)
Dept: SURGERY | Facility: CLINIC | Age: 74
End: 2024-04-23
Attending: PHYSICIAN ASSISTANT
Payer: COMMERCIAL

## 2024-04-23 VITALS
TEMPERATURE: 98.5 F | HEIGHT: 65 IN | SYSTOLIC BLOOD PRESSURE: 144 MMHG | BODY MASS INDEX: 28.07 KG/M2 | DIASTOLIC BLOOD PRESSURE: 86 MMHG | RESPIRATION RATE: 16 BRPM | OXYGEN SATURATION: 95 % | HEART RATE: 67 BPM | WEIGHT: 168.5 LBS

## 2024-04-23 DIAGNOSIS — K44.9 HIATAL HERNIA WITH GASTROESOPHAGEAL REFLUX: ICD-10-CM

## 2024-04-23 DIAGNOSIS — K21.9 HIATAL HERNIA WITH GASTROESOPHAGEAL REFLUX: ICD-10-CM

## 2024-04-23 DIAGNOSIS — K44.9 HIATAL HERNIA WITH GASTROESOPHAGEAL REFLUX: Primary | ICD-10-CM

## 2024-04-23 DIAGNOSIS — R13.19 ESOPHAGEAL DYSPHAGIA: ICD-10-CM

## 2024-04-23 DIAGNOSIS — K21.9 HIATAL HERNIA WITH GASTROESOPHAGEAL REFLUX: Primary | ICD-10-CM

## 2024-04-23 DIAGNOSIS — K22.4 ESOPHAGEAL DYSMOTILITY: ICD-10-CM

## 2024-04-23 PROCEDURE — G0463 HOSPITAL OUTPT CLINIC VISIT: HCPCS | Performed by: THORACIC SURGERY (CARDIOTHORACIC VASCULAR SURGERY)

## 2024-04-23 PROCEDURE — 99204 OFFICE O/P NEW MOD 45 MIN: CPT | Performed by: THORACIC SURGERY (CARDIOTHORACIC VASCULAR SURGERY)

## 2024-04-23 RX ORDER — ENOXAPARIN SODIUM 100 MG/ML
40 INJECTION SUBCUTANEOUS
Status: CANCELLED | OUTPATIENT
Start: 2024-04-23

## 2024-04-23 RX ORDER — ASCORBIC ACID 250 MG
250 TABLET,CHEWABLE ORAL EVERY MORNING
COMMUNITY

## 2024-04-23 RX ORDER — BUPROPION HYDROCHLORIDE 150 MG/1
150 TABLET, FILM COATED, EXTENDED RELEASE ORAL EVERY MORNING
COMMUNITY

## 2024-04-23 RX ORDER — BUSPIRONE HYDROCHLORIDE 15 MG/1
15 TABLET ORAL 2 TIMES DAILY
COMMUNITY
Start: 2023-11-01

## 2024-04-23 ASSESSMENT — PAIN SCALES - GENERAL: PAINLEVEL: MODERATE PAIN (4)

## 2024-04-23 NOTE — NURSING NOTE
"Oncology Rooming Note    April 23, 2024 2:42 PM   Flaco Hickman is a 74 year old male who presents for:    Chief Complaint   Patient presents with    Oncology Clinic Visit     [facility] NEW - HIATAL HERNIA     Initial Vitals: BP (!) 144/86 (BP Location: Right arm, Patient Position: Chair, Cuff Size: Adult Large)   Pulse 67   Temp 98.5  F (36.9  C) (Oral)   Resp 16   Ht 1.649 m (5' 4.92\")   Wt 76.4 kg (168 lb 8 oz)   SpO2 95%   BMI 28.11 kg/m   Estimated body mass index is 28.11 kg/m  as calculated from the following:    Height as of this encounter: 1.649 m (5' 4.92\").    Weight as of this encounter: 76.4 kg (168 lb 8 oz). Body surface area is 1.87 meters squared.  Moderate Pain (4) Comment: Data Unavailable   No LMP for male patient.  Allergies reviewed: Yes  Medications reviewed: Yes    Medications: Medication refills not needed today.  Pharmacy name entered into fintonic: [facility] - [address]    Frailty Screening:   Is the patient here for a new oncology consult visit in cancer care? 1. Yes. Over the past month, have you experienced difficulty or required a caregiver to assist with:   1. Balance, walking or general mobility (including any falls)? NO  2. Completion of self-care tasks such as bathing, dressing, toileting, grooming/hygiene?  NO  3. Concentration or memory that affects your daily life?  YES       Popeye Allen LPN              "

## 2024-04-23 NOTE — LETTER
4/23/2024         RE: Flaco Hickman  [address]  [place] [address]        Dear Colleague,    Thank you for referring your patient, Flaco Hickman, to the [facility]. Please see a copy of my visit note below.    THORACIC SURGERY - NEW PATIENT OFFICE VISIT      Dear JASPAL Hui,    I saw Flaco Hickman at your request in consultation for the evaluation and treatment of a hiatal hernia.     HPI  Flaco Hickman is a 74 year old male with dysphagia to solids and intermittently with liquids and pills. He takes about 1/2 hour to eat. He regurgitates solids and liquids. This has been going on for about one to two years.  He started with heartburn in his early 30's. His weight is stable. He is on omeprazole once daily and has no heartburn or nocturnal symptoms.    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 He was placed on medication.     Previsit Tests   CT chest (04/16/2024): Moderate to large hiatal hernia. Focal bony expansion of the lateral 6th and 7th ribs, without evidence of cortical destruction thought to be benign .      Esophagram (01/08/2024): Moderate-sized hiatal hernia. Moderate esophageal dysmotility with tertiary contractions, proximal escape and stasis.     EGD 11/20/2023: Hiatal hernia, GE flap valve Hill grade 4, chronic gastritis, GE junction changes consistent with reflux, candida    PMH  Reviewed, as below    Past Medical History:   Diagnosis Date    Allergies     Depression     Hypercholesteremia     Hypothyroidism     Nerve pain     right hand   Right shoulder melanoma  Migraine s/p botox injections    PSH  Reviewed, as below    Past Surgical History:   Procedure Laterality Date    BLEPHAROPLASTY, BROW LIFT, COMBINED Bilateral 11/15/2021    Procedure: Both upper eyelid blepharoplasty and direct browplasty;  Surgeon: Desirae Sanchez MD;  Location:  OR  "   Soft tissue excision (03/2024)    ETOH:  None  TOB:  ages 10 - 64, 1 pack per day. Quit 10 years ago      Physical examination  BP (!) 144/86 (BP Location: Right arm, Patient Position: Chair, Cuff Size: Adult Large)   Pulse 67   Temp 98.5  F (36.9  C) (Oral)   Resp 16   Ht 1.649 m (5' 4.92\")   Wt 76.4 kg (168 lb 8 oz)   SpO2 95%   BMI 28.11 kg/m     Physical Exam  Constitutional:       Appearance: Normal appearance. He is normal weight.   Eyes:      Conjunctiva/sclera: Conjunctivae normal.   Cardiovascular:      Rate and Rhythm: Normal rate.   Pulmonary:      Effort: Pulmonary effort is normal.   Abdominal:      General: Abdomen is flat.   Skin:     General: Skin is warm and dry.   Neurological:      Mental Status: He is alert and oriented to person, place, and time.   Psychiatric:         Mood and Affect: Mood normal.         Behavior: Behavior normal.         Thought Content: Thought content normal.         Judgment: Judgment normal.          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 They have two children and six grandchildren.       Code Status: Full Code    IMPRESSION (K44.9,  K21.9) Hiatal hernia with gastroesophageal reflux    (K22.4) Esophageal dysmotility    (R13.19) Esophageal dysphagia      This person is a 74 year old male with a symptomatic large hiatal hernia. He is a good surgical candidate pending the result of the manometry. I think his symptoms are attributable to the hernia and not nutcracker esophagus.     PLAN  I spent 30 min on the date of the encounter in chart review, patient visit, review of tests, documentation and/or discussion with other providers about the issues documented above. I reviewed the plan as follows:    Procedure planned: Robotic-assisted laparoscopic hiatal hernia repair, possible gastroplasty, possible gastrostomy, fundoplication    The risks include, but are not limited to the following.  There is " "a risk of injury to the stomach, esophagus or abdominal contents.  There is a risk of injury to the vagus nerves, which could result in functional emptying issues for the stomach.  There is a risk of problems with the wrap, which can include difficulty swallowing, persistent reflux and pain.  Coughing or retching/vomiting in the weeks after surgery can result in breakdown of the repair.  If the esophagus does not reach the abdominal cavity, a \"new\" esophagus will be made out of the top of the stomach.  This gastroplasty can dilate over time, causing new swallowing difficulties, and continues to produce acid.  The wrap may loosen over time and need to be revised with a new operation.  There is a risk of postoperative swelling from air under the skin, causing your face and chest to appear swollen. This is not dangerous and will reabsorb over time. The lung cavities may be entered and tubes placed to evacuate air and fluid.   Finally, there is a risk of death.       Necessary Preop Tests & Appointments: Preoperative assessment clinic    Regional Anesthesia Plan: None    Anticoagulation Plan: Prophylactic Lovenox      I appreciate the opportunity to participate in the care of your patient and will keep you updated.      Sincerely,    Darren Davidson MD     "

## 2024-04-30 ENCOUNTER — PATIENT OUTREACH (OUTPATIENT)
Dept: GASTROENTEROLOGY | Facility: CLINIC | Age: 74
End: 2024-04-30
Payer: MEDICARE

## 2024-04-30 NOTE — TELEPHONE ENCOUNTER
Spoke with pt's spouse regarding timing of esophageal manometry on 5/2 and reviewed information and instructions for test. Full understanding verbalized. Pt and spouse will call back as needed.

## 2024-05-02 ENCOUNTER — OFFICE VISIT (OUTPATIENT)
Dept: GASTROENTEROLOGY | Facility: CLINIC | Age: 74
End: 2024-05-02
Payer: MEDICARE

## 2024-05-02 VITALS
HEIGHT: 67 IN | TEMPERATURE: 97.3 F | SYSTOLIC BLOOD PRESSURE: 131 MMHG | DIASTOLIC BLOOD PRESSURE: 82 MMHG | RESPIRATION RATE: 16 BRPM | OXYGEN SATURATION: 96 % | HEART RATE: 61 BPM | BODY MASS INDEX: 25.43 KG/M2 | WEIGHT: 162 LBS

## 2024-05-02 DIAGNOSIS — K44.9 HIATAL HERNIA: Primary | ICD-10-CM

## 2024-05-02 DIAGNOSIS — R13.19 ESOPHAGEAL DYSPHAGIA: ICD-10-CM

## 2024-05-02 DIAGNOSIS — K22.4 ESOPHAGEAL DYSMOTILITY: ICD-10-CM

## 2024-05-02 PROCEDURE — 91037 ESOPH IMPED FUNCTION TEST: CPT | Performed by: PHYSICIAN ASSISTANT

## 2024-05-02 PROCEDURE — 91010 ESOPHAGUS MOTILITY STUDY: CPT | Performed by: PHYSICIAN ASSISTANT

## 2024-05-02 ASSESSMENT — PAIN SCALES - GENERAL: PAINLEVEL: MODERATE PAIN (4)

## 2024-05-02 NOTE — PATIENT INSTRUCTIONS
Esophogeal Manometry Study  1. Resume regular diet.  2. You may have a bloody nose or sore throat after the procedure.  3. If you have questions call [phone] from 7:00am-5:00pm.  For afterhours questions call GI doctor on call at [phone].

## 2024-05-02 NOTE — PROGRESS NOTES
Non-Invasive GI Procedure Visit    Flaco Hickman is a 74 year old male with history of    Hiatal hernia  Esophageal dysmotility  Esophageal dysphagia.   Patient stated reason for procedure: Dysphagia and Regurgitation      COVID-19 PCR Results           No data to display              COVID-19 Antibody Results, Testing for Immunity           No data to display                Pre-Procedure Assessment  Patient presents to clinic today for Esophageal Manometry Study    Referring Provider: Macey BURGOS  Patient has undergone previous endoscopy.    Does patient report taking a PPI (omeprazole, pantoprazole, rabeprazole, lansoprazole, esomeprazole, dexlansoprazole)? Yes. Is patient taking a PPI twice daily? No  Does patient report taking a H2 blocker (ranitidine, or famotidine)? No  Does patient report taking opioids? No  Patient reported that last food and/or drink was last consumed 10 hours ago.  Esophageal Questionnaire(s) Completed: Yes - Esophageal Questionnaire(s)    BEDQ Questionnaire      3/16/2024     9:34 PM 4/30/2024    10:23 PM   BEDQ Questionnaire: How Often Have You Had the Following?   Trouble eating solid food (meat, bread, vegetables) 4 4   Trouble eating soft foods (yogurt, jello, pudding) 1 4   Trouble swallowing liquids 2 3   Pain while swallowing 2 0   Coughing or choking while swallowing foods or liquids 4 4   Total Score: 13 15         3/16/2024     9:34 PM 4/30/2024    10:23 PM   BEDQ Questionnaire: Discomfort/Pain Ratings   Eating solid food (meat, bread, vegetables) 4 3   Eating soft foods (yogurt, jello, pudding) 2 3   Drinking liquid 3 2   Total Score: 9 8       Eckardt Questionnaire      3/16/2024     9:37 PM 4/30/2024    10:24 PM   Eckardt Questionnaire   Dysphagia 1 2   Regurgitation 1 2   Retrosternal Pain 0 0   Weight Loss (kg) 0 0   Total Score:  2 4       Promis 10 Questionnaire      3/16/2024     9:40 PM 4/30/2024    10:27 PM   PROMIS 10 FLOWSHEET DATA   In general,  "would you say your health is: 2 2   In general, would you say your quality of life is: 2 2   In general, how would you rate your physical health? 2 2   In general, how would you rate your mental health, including your mood and your ability to think? 2 2   In general, how would you rate your satisfaction with your social activities and relationships? 2 2   In general, please rate how well you carry out your usual social activities and roles. (This includes activities at home, at work and in your community, and responsibilities as a parent, child, spouse, employee, friend, etc.) 3 3   To what extent are you able to carry out your everyday physical activities such as walking, climbing stairs, carrying groceries, or moving a chair? 4 3   In the past 7 days, how often have you been bothered by emotional problems such as feeling anxious, depressed, or irritable? 4 4   In the past 7 days, how would you rate your fatigue on average? 2 3   In the past 7 days, how would you rate your pain on average, where 0 means no pain, and 10 means worst imaginable pain? 5 4   Mental health question re-calculation - no clinical value 2 2   Physical health question re-calculation - no clinical value 4 3   Pain question re-calculation - no clinical value 3 3   Global Mental Health Score 8 8   Global Physical Health Score 13 11   PROMIS TOTAL - SUBSCORES 21 19   .    Patient Hx  Patient's history, medications and allergies were reviewed.     Height: 5' 7\"   Weight: 162 lbs 0 oz    Patient Active Problem List    Diagnosis Date Noted    Dermatochalasis of both upper eyelids 09/22/2021     Priority: Medium     Added automatically from request for surgery [id number]      Brow ptosis, bilateral 09/22/2021     Priority: Medium     Added automatically from request for surgery [id number]        Prior to Admission medications    Medication Sig Start Date End Date Taking? Authorizing Provider   acetaminophen (TYLENOL) 325 MG tablet Take 325-650 mg by mouth  "   Reported, Patient   ascorbic acid (VITAMIN C) 250 MG CHEW chewable tablet     Reported, Patient   atorvastatin (LIPITOR) 80 MG tablet Take 40 mg by mouth    Reported, Patient   buPROPion (ZYBAN) 150 MG 12 hr tablet     Reported, Patient   busPIRone (BUSPAR) 15 MG tablet  11/1/23   Reported, Patient   cetirizine (ZYRTEC) 10 MG tablet Take 10 mg by mouth    Reported, Patient   cholecalciferol (VITAMIN D3) 25 mcg (1000 units) capsule Take 1 capsule by mouth    Reported, Patient   gabapentin (NEURONTIN) 300 MG capsule Take 600 mg by mouth    Reported, Patient   hypromellose-dextran (ARTIFICAL TEARS) 0.1-0.3 % ophthalmic solution Apply 1 drop to eye    Reported, Patient   levothyroxine (SYNTHROID/LEVOTHROID) 75 MCG tablet Take 75 mcg by mouth    Reported, Patient   loratadine (CLARITIN) 10 MG tablet Take 10 mg by mouth    Reported, Patient   omeprazole (PRILOSEC) 20 MG DR capsule Take 20 mg by mouth    Reported, Patient   predniSONE (DELTASONE) 20 MG tablet Take 20 mg by mouth    Reported, Patient   vitamin B complex with vitamin C (VITAMIN  B COMPLEX) tablet  9/1/22   Reported, Patient     Allergies   Allergen Reactions    Aspirin Nausea and Vomiting    Nsaids Other (See Comments)     Causes ulcers    Diclofenac      Other reaction(s): Unknown Reaction    Latex Other (See Comments)     HIVES     Past Medical History:   Diagnosis Date    Allergies     Depression     Hypercholesteremia     Hypothyroidism     Nerve pain     right hand     Past Surgical History:   Procedure Laterality Date    BLEPHAROPLASTY, BROW LIFT, COMBINED Bilateral 11/15/2021    Procedure: Both upper eyelid blepharoplasty and direct browplasty;  Surgeon: Desirae Sanchez MD;  Location:  OR     No family history on file.  Social History     Tobacco Use    Smoking status: Former    Smokeless tobacco: Never   Substance Use Topics    Alcohol use: Not on file        Pre-Procedure Education & Consent  Procedure education was provided to:  Patient  Teaching method: Explanation  Barriers to learning: No Barrier    Patient indicated understanding of pre-procedure instruction and appropriate consent was obtained and documented.    ____________________________________________________________________    Post-Procedure Documentation: Esophageal Manometry    Manometry catheter was placed via left nare to 47 cm and normal saline swallows given per protocol. Manometry catheter was removed at the end of test.    Discharge instructions given to patient.    Notification of pending test results sent to provider for interpretation. Please reference scanned document for final interpretation of results. Patient will follow up with referring provider for test results.    Lauryn Stephens RN on 5/2/2024 at 10:58 AM

## 2024-05-10 ENCOUNTER — TELEPHONE (OUTPATIENT)
Dept: ONCOLOGY | Facility: CLINIC | Age: 74
End: 2024-05-10
Payer: MEDICARE

## 2024-05-10 NOTE — TELEPHONE ENCOUNTER
Called patient to discuss surgery scheduling with Dr. Davidson. Spoke with patient and scheduled surgery with Dr. Davidson for 8/16/24 at [place] per patient request. He is going on vacation in July and asked for August surgry date.     H&P scheduled with PAC for 8/12/24. Patient is aware they will get their arrival time for surgery at PAC appointment.    Post-op scheduled with Shannan Francis on 9/9/24. XR ESOPHOGRAM scheduled for 9/9 before post-op appointment in [place].    Writer will mail surgery packet via [employer] on 5/10/24.    The patient has no further questions regarding surgery at this time.     Aarti Noel on 5/10/2024 at 12:05 PM

## 2024-05-13 NOTE — TELEPHONE ENCOUNTER
FUTURE VISIT INFORMATION      SURGERY INFORMATION:  Date: 24  Location: [facility] or  Surgeon:  Darren Davidson MD   Anesthesia Type:  general  Procedure: Robot-assisted laparoscopic hiatal hernia repair, partial fundoplication, possible gastroplasty, gastrostomy   Consult: ov 24    RECORDS REQUESTED FROM:       Primary Care Provider: Macey Hui PA-C     Most recent EKG+ Tracin21- [facility]

## 2024-05-18 ENCOUNTER — HEALTH MAINTENANCE LETTER (OUTPATIENT)
Age: 74
End: 2024-05-18

## 2024-06-06 ENCOUNTER — TRANSFERRED RECORDS (OUTPATIENT)
Dept: HEALTH INFORMATION MANAGEMENT | Facility: CLINIC | Age: 74
End: 2024-06-06
Payer: MEDICARE

## 2024-06-14 ENCOUNTER — OFFICE VISIT (OUTPATIENT)
Dept: GASTROENTEROLOGY | Facility: CLINIC | Age: 74
End: 2024-06-14
Attending: PHYSICIAN ASSISTANT
Payer: COMMERCIAL

## 2024-06-14 VITALS
DIASTOLIC BLOOD PRESSURE: 89 MMHG | HEART RATE: 74 BPM | BODY MASS INDEX: 26.37 KG/M2 | WEIGHT: 168 LBS | OXYGEN SATURATION: 98 % | HEIGHT: 67 IN | SYSTOLIC BLOOD PRESSURE: 145 MMHG

## 2024-06-14 DIAGNOSIS — R13.19 ESOPHAGEAL DYSPHAGIA: ICD-10-CM

## 2024-06-14 DIAGNOSIS — K44.9 HIATAL HERNIA: ICD-10-CM

## 2024-06-14 DIAGNOSIS — K22.4 ESOPHAGEAL DYSMOTILITY: ICD-10-CM

## 2024-06-14 PROCEDURE — 99214 OFFICE O/P EST MOD 30 MIN: CPT | Performed by: PHYSICIAN ASSISTANT

## 2024-06-14 ASSESSMENT — PAIN SCALES - GENERAL: PAINLEVEL: MODERATE PAIN (4)

## 2024-06-14 NOTE — PATIENT INSTRUCTIONS
It was a pleasure taking care of you today.  I've included a brief summary of our discussion and care plan from today's visit below.  Please review this information with your primary care provider.  _______________________________________________________________________    My recommendations are summarized as follows:    - Await final results of high resolution manometry study   - Esophagram with upper GI series scheduled 9/9/2024  - Continue Omeprazole 20mg which is best taken on empty stomach 30 to 60 minutes prior to the first meal of the day  - Reflux friendly lifestyle modifications as directed within the AVS  - With emphasis placed on refraining from eating solid foods 3-4 hours prior to bed and sleep with the head in the bed elevated to at least 30 degrees or with a wedge pillow  - Follow-up in office in 6 months which should be after your surgical intervention only if you are continuing to have GI related concerns    Hot Options:  [address] [place] [address] - [facility] (2 blocks)  [address] [place] [id number] - [place] (5-6 blocks)       Gastroesophageal Reflux Disease (GERD) Lifestyle Modifications:   If taking acid suppression therapy (PPI ie Pantoprazole, Lansoprazole, Omeprazole, Esomeprazole, Rabeprazole, Dexlansoprazole) it should be taken 30 - 60 minutes prior to meals on an empty stomach to have maximum effect  Avoid triggers for reflux such as coffee, chocolate, carbonated beverages, spicy foods, acidic foods (tomato based/citrus and foods with high fat content   Abstinence from alcohol and cessation of all tobacco products is recommended   Studies have shown that weight loss, exercise and maintaining a healthy BMI significantly reduce GERD symptoms   Remain upright while eating and immediately after meals  Do not eat or drink at least 3 hours prior to laying down supine/laying down for bed   Avoid late night/middle of the night snacking    Consider obtaining a wedge  pillow or elevating the head end of the bed while sleeping   Avoid sleeping right side down as this can place the lower part of the esophagus/lower esophageal sphincter in a dependent position that favors reflux   Attempting to eat smaller more frequent meals may improve symptoms       To schedule endoscopic procedures you may call: [phone]  To schedule radiology (imaging) tests you may call: [phone]  To schedule an ENT appointment you may call: [phone]    Please call my nurse Lauryn ([phone]), Marie ([phone]) with any questions or concerns.      Return to GI Clinic in 6 months as needed.    _______________________________________________________________________    Who do I call with any questions after my visit?  Please be in touch if there are any further questions that arise following today's visit.  There are multiple ways to contact your gastroenterology care team.      During business hours, you may reach a Gastroenterology nurse at [phone] and choose option 3.       To schedule or reschedule an appointment, please call [phone].     You can always send a secure message through [employer].  Ar[employer] messages are answered by your nurse or doctor typically within 24 hours.  Please allow extra time on weekends and holidays.      For urgent/emergent questions after business hours, you may reach the on-call GI Fellow by contacting the [facility]  at [phone].     How will I get the results of any tests ordered?    You will receive all of your results.  If you have signed up for [employer], any tests ordered at your visit will be available to you after your physician reviews them.  Typically this takes 1-2 weeks.  If there are urgent results that require a change in your care plan, your physician or nurse will call you to discuss the next steps.      What is [employer]sc[employer]?  [employer] is a secure way for you to access all of your healthcare records from the [facility]  [place].  It is a web based computer program, so you can sign on to it from any location.  It also allows you to send secure messages to your care team.  I recommend signing up for First Coverage access if you have not already done so and are comfortable with using a computer.      How to I schedule a follow-up visit?  If you did not schedule a follow-up visit today, please call [phone] to schedule a follow-up office visit.      If you feel you received exceptional care and are interested in supporting the clinical and research goals of Macey Hui PA-C or the Division of Gastroenterology, Hepatology, and Nutrition please contact [email or web address] from the [facility] to discuss opportunities to donate.    Sincerely,    Macey Hui PA-C  Division of Gastroenterology, Hepatology, and Nutrition  [facility]

## 2024-06-14 NOTE — NURSING NOTE
"Chief Complaint   Patient presents with    Follow Up       Vitals:    06/14/24 1140   BP: (!) 145/89   Pulse: 74   SpO2: 98%   Weight: 76.2 kg (168 lb)   Height: 1.702 m (5' 7\")       Body mass index is 26.31 kg/m .    Sandrine Jackson MA    "

## 2024-06-14 NOTE — PROGRESS NOTES
"  Gastroenterology Visit for: Flaco Hickman 1950   MRN: [id number]     Reason for Visit:  chief complaint    Referred by: [place]  / [facility] [address] [facility] / [place]  Patient Care Team:  Macey Hui PA-C as PCP - General (Gastroenterology)  Darren Davidson MD as MD (Cardiovascular & Thoracic Surgery)  Macey Hui PA-C as Physician Assistant (Gastroenterology)  Macey Hui PA-C as Assigned Gastroenterology Provider  Darren Davidson MD as Assigned Heart and Vascular Provider    History of Present Illness:   Flaco Hickman is 74 year old male with significant past medical history pertinent for HTN, hypothyroidism who is presenting as a follow-up patient with a chief complaint of dysphagia.    -----------------------------------------------------------------------------------------------------------------------------------------------------------------------------------------------------------------------------------  Interval History June 14, 2024:    Today Flaco reports that his symptoms are overall stable.  His chief concern is that of dysphagia which is predominantly to solids and intermittently to semisolids, liquids and pills.  He denies classic symptoms of reflux including heartburn/regurgitation.  His dysphagia symptoms are occurring daily.  He notes that after he has a food bolus sensation he often has regurgitation/expulsion of the previously consumed food particle.  Often this resulted in emesis which is not self-induced.    Denies weight loss, nausea, odynophagia, abdominal pain, diarrhea, constipation, melena, hematochezia and BRBR.     ----------------------------------------------------------------------------------------------------------------------------------------------------------------------------------------------  3/2024 HPI:     With prior remote diagnosis of \"Nutcracker Esophagus\" from [facility]. He was subsequently " "prescribed Reglan and Nitroglycerin.     When asked to describe his symptoms today Cristobal states \"when I eat it will get stuck and then I will have to vomit.\" This is predominately with solids with intermittent dysphagia to semisolids, liquids and pills. Onset was approximately 1 year prior and symptoms have been progressive. Now dysphagia occurring every other day if not every day. When this occurs Cristobal has to stop eating and regurgitate/emesis the previously consumed food particles.     Denies classic symptoms of reflux including heartburn and regurgitation.    Denies weight loss, nausea, odynophagia, dysphonia/hoarseness, heartburn, regurgitation, abdominal pain, early satiation, early satiety, diarrhea, constipation (< 3 stools per week), incontinence of feces, melena, hematochezia and BRBR.     Father had colon cancer diagnosed in early 70s.    No additional family history or GI related malignancy (esophageal, gastric, pancreatic, liver or colon).       Esophageal Questionnaire(s)    BEDQ Questionnaire      3/16/2024     9:34 PM 4/30/2024    10:23 PM 6/10/2024    11:50 PM   BEDQ Questionnaire: How Often Have You Had the Following?   Trouble eating solid food (meat, bread, vegetables) 4 4 3   Trouble eating soft foods (yogurt, jello, pudding) 1 4 2   Trouble swallowing liquids 2 3 3   Pain while swallowing 2 0 0   Coughing or choking while swallowing foods or liquids 4 4 3   Total Score: 13 15 11         3/16/2024     9:34 PM 4/30/2024    10:23 PM 6/10/2024    11:50 PM   BEDQ Questionnaire: Discomfort/Pain Ratings   Eating solid food (meat, bread, vegetables) 4 3 3   Eating soft foods (yogurt, jello, pudding) 2 3 3   Drinking liquid 3 2 2   Total Score: 9 8 8       Eckardt Questionnaire      3/16/2024     9:37 PM 4/30/2024    10:24 PM 6/10/2024    11:53 PM   Eckardt Questionnaire   Dysphagia 1 2 2   Regurgitation 1 2 2   Retrosternal Pain 0 0 0   Weight Loss (kg) 0 0 0   Total Score:  2 4 4       Promis 10 " Questionnaire      3/16/2024     9:40 PM 4/30/2024    10:27 PM 6/10/2024    11:56 PM   PROMIS 10 FLOWSHEET DATA   In general, would you say your health is: 2 2 2   In general, would you say your quality of life is: 2 2 2   In general, how would you rate your physical health? 2 2 2   In general, how would you rate your mental health, including your mood and your ability to think? 2 2 2   In general, how would you rate your satisfaction with your social activities and relationships? 2 2 3   In general, please rate how well you carry out your usual social activities and roles. (This includes activities at home, at work and in your community, and responsibilities as a parent, child, spouse, employee, friend, etc.) 3 3 3   To what extent are you able to carry out your everyday physical activities such as walking, climbing stairs, carrying groceries, or moving a chair? 4 3 3   In the past 7 days, how often have you been bothered by emotional problems such as feeling anxious, depressed, or irritable? 4 4 4   In the past 7 days, how would you rate your fatigue on average? 2 3 2   In the past 7 days, how would you rate your pain on average, where 0 means no pain, and 10 means worst imaginable pain? 5 4 4   Mental health question re-calculation - no clinical value 2 2 2   Physical health question re-calculation - no clinical value 4 3 4   Pain question re-calculation - no clinical value 3 3 3   Global Mental Health Score 8 8 9   Global Physical Health Score 13 11 12   PROMIS TOTAL - SUBSCORES 21 19 21       STUDIES & PROCEDURES:    EGD:     11/2023 [facility]  Findings:        hiatal hernia : hill grade 4 , 38 cm hiatus measure at        Z line 31 cm        GE junction : cold forcep biopsy x 4        Antral biopsy: cold forcep biopsy x 2        Evidence of chronic gastritis in stomach   Impression:            - specimens collected.     Recommendation:        - Await pathology results. Will order upper GI for                           evaluation of dysphagia     Final Diagnosis     A. ESOPHAGOGASTRIC JUNCTION, BIOPSY  --SQUAMOUS AND GASTRIC MUCOSA WITH CHANGES CONSISTENT WITH REFLUX  --DETACHED FRAGMENT OF SQUAMOUS EPITHELIUM WITH FUNGAL FORMS CONSISTENT WITH CANDIDA SPECIES  --SEE COMMENT     B. STOMACH, ANTRUM, BIOPSY  --BODY-TYPE GASTRIC MUCOSA WITH FOCAL CHRONIC INFLAMMATION  --NO HELICOBACTER PYLORI IDENTIFIED       Electronically signed by Kari Olvera MD on 11/6/2023 at  1:16 PM   Comment     Given the detached nature of the squamous epithelium, the possibility of oropharyngeal contamination cannot be excluded       11/2021  Component 2 yr ago   Final Diagnosis     A. GASTRIC MUCOSA, BIOPSY   --BENIGN GASTRIC MUCOSA WITH FEATURES OF REACTIVE GASTROPATHY   --NEGATIVE FOR HELICOBACTER ORGANISMS BY IMMUNOHISTOCHEMICAL STAIN   --NO EVIDENCE OF DYSPLASIA OR MALIGNANCY      B. GASTRIC FUNDIC POLYP, BIOPSY   --BENIGN FUNDIC GLAND POLYP  --NO EVIDENCE OF DYSPLASIA OR MALIGNANCY         Colonoscopy:    11/2021 [facility]   Findings:        The perianal and digital rectal examinations were normal. Pertinent        negatives include normal sphincter tone, no palpable rectal lesions and        no anal lesion or abnormality.        External and internal hemorrhoids were found during retroflexion, during        perianal exam, during digital exam and during endoscopy. The hemorrhoids        were medium-sized.        A few small-mouthed diverticula were found in the sigmoid colon.        The terminal ileum appeared normal.     Impression:            - External and internal hemorrhoids.                          - Diverticulosis in the sigmoid colon.                          - The examined portion of the ileum was normal.                          - No specimens collected.     Colorectal cancer in father, Last colonoscopy: FIve                          years ago at the [facility].       EndoFLIP directed at the UES or  LES (8cm (EF-325) balloon length or 16cm (EF-322) balloon length):   Date:  8cm balloon  Balloon inflation Balloon pressure CSA (mm^2) DI (mm^2/mmHg) Dmin (mm) Compliance   20 (ladmark ID)        30        40        50           16cm balloon  Balloon inflation Balloon pressure CSA (mm^2) DI (mm^2/mmHg) Dmin (mm) Compliance   30 (ladmark ID)        40        50        60        70           High Resolution Manometry:    PH/Impedance:     Bravo:    CT:    Esophagram:    UGI and Esophagram   FINDINGS:   Following administration of effervescent crystals, barium was given.  The no   discrete mucosal abnormality along the course of the esophagus.  There is   moderate esophageal dysmotility showing tertiary contractions and proximal   escape with stasis.  Moderate-sized hiatal hernia.  Duodenum and proximal   jejunum appear normal.  No visible gastroesophageal reflux elicited.  Barium   tablet passes through the gastroesophageal junction without delay.   image   shows multilevel spondylosis and degenerative curvature of the spine.     IMPRESSION:   1. Moderate-sized hiatal hernia.   2. Normal appearance of the proximal small bowel.   3. Moderate esophageal dysmotility with tertiary contractions, proximal escape,   and stasis.      FL VSS:     GES:    U/S:     XRAY:    Other:       Prior medical records were reviewed including, but not limited to, notes from referring providers, lab work, radiographic tests, and other diagnostic tests. Pertinent results were summarized above.     History     Past Medical History:   Diagnosis Date    Allergies     Depression     Hypercholesteremia     Hypothyroidism     Nerve pain     right hand       Past Surgical History:   Procedure Laterality Date    BLEPHAROPLASTY, BROW LIFT, COMBINED Bilateral 11/15/2021    Procedure: Both upper eyelid blepharoplasty and direct browplasty;  Surgeon: Desirae Sanchez MD;  Location:  OR       Social History     Socioeconomic History    Marital  status:      Spouse name: Not on file    Number of children: Not on file    Years of education: Not on file    Highest education level: Not on file   Occupational History    Not on file   Tobacco Use    Smoking status: Former    Smokeless tobacco: Never   Substance and Sexual Activity    Alcohol use: Not on file    Drug use: Not on file    Sexual activity: Not on file   Other Topics Concern    Not on file   Social History Narrative    Not on file     Social Determinants of Health     Financial Resource Strain: Not on file   Food Insecurity: Not on file   Transportation Needs: Not on file   Physical Activity: Not on file   Stress: Not on file   Social Connections: Not on file   Interpersonal Safety: Not At Risk (1/1/2024)    Received from [facility]    Intimate Partner Violence     Physically hurt, threatened and/or made to feel afraid: No   Housing Stability: Not on file       No family history on file.  Family history reviewed and edited as appropriate    Medications and Allergies:     Outpatient Encounter Medications as of 6/14/2024   Medication Sig Dispense Refill    acetaminophen (TYLENOL) 325 MG tablet Take 325-650 mg by mouth      ascorbic acid (VITAMIN C) 250 MG CHEW chewable tablet       atorvastatin (LIPITOR) 80 MG tablet Take 40 mg by mouth      buPROPion (ZYBAN) 150 MG 12 hr tablet       busPIRone (BUSPAR) 15 MG tablet       cetirizine (ZYRTEC) 10 MG tablet Take 10 mg by mouth      cholecalciferol (VITAMIN D3) 25 mcg (1000 units) capsule Take 1 capsule by mouth      gabapentin (NEURONTIN) 300 MG capsule Take 600 mg by mouth      hypromellose-dextran (ARTIFICAL TEARS) 0.1-0.3 % ophthalmic solution Apply 1 drop to eye      levothyroxine (SYNTHROID/LEVOTHROID) 75 MCG tablet Take 75 mcg by mouth      loratadine (CLARITIN) 10 MG tablet Take 10 mg by mouth      omeprazole (PRILOSEC) 20 MG DR capsule Take 20 mg by mouth      predniSONE (DELTASONE) 20 MG tablet Take 20 mg by mouth    "   vitamin B complex with vitamin C (VITAMIN  B COMPLEX) tablet        No facility-administered encounter medications on file as of 6/14/2024.        Allergies   Allergen Reactions    Aspirin Nausea and Vomiting    Nsaids Other (See Comments)     Causes ulcers    Diclofenac      Other reaction(s): Unknown Reaction    Latex Other (See Comments)     HIVES        Review of systems:  A full 10 point review of systems was obtained and was negative except for the pertinent positives and negatives stated within the HPI.    Objective Findings:   Physical Exam:    Constitutional: BP (!) 145/89   Pulse 74   Ht 1.702 m (5' 7\")   Wt 76.2 kg (168 lb)   SpO2 98%   BMI 26.31 kg/m    General: Alert, cooperative, no distress, well-appearing  Head: Atraumatic, normocephalic, no obvious abnormalities   Eyes: Sclera anicteric, no obvious conjunctival hemorrhage   Nose: Nares normal, no obvious malformation, no obvious rhinorrhea   Respiratory: Resting comfortably, no apparent distress, no cough.   Skin: No jaundice, no obvious rash  Neurologic: AAOx3, no obvious neurologic abnormality  Psychiatric: Normal Affect, appropriate mood  Extremities: No obvious edema, no obvious malformation     Labs, Radiology, Pathology     No results found for: \"WBC\", \"HGB\", \"PLT\", \"CHOL\", \"TRIG\", \"HDL\", \"LDLDIRECT\", \"ALT\", \"AST\", \"NA\", \"CREATININE\", \"BUN\", \"CO2\", \"TSH\", \"INR\", \"GLUF\"     Liver Function Studies - No results for input(s): \"PROTTOTAL\", \"ALBUMIN\", \"BILITOTAL\", \"ALKPHOS\", \"AST\", \"ALT\", \"BILIDIRECT\" in the last 28843 hours.       Patient Active Problem List    Diagnosis Date Noted    Dermatochalasis of both upper eyelids 09/22/2021     Priority: Medium     Added automatically from request for surgery [id number]      Brow ptosis, bilateral 09/22/2021     Priority: Medium     Added automatically from request for surgery [id number]        Assessment and Plan   Assessment/Plan:    Flaco Hickman is 74 year old male with significant past " "medical history pertinent for HTN, hypothyroidism who is presenting as a follow-up patient with a chief complaint of dysphagia.    #Dysphagia - Esophageal   #Hiatal Hernia - Large 7 cm   #History of \"Nutcracker esophagus\"  Initially Cristobal presented to clinic 3/19/2024 with a 1 year history of progressive dysphagia now occurring daily.  Predominant the dysphagia is to solids with intermittent difficulties with semisolid, liquids and pills. He denies classic symptoms of reflux including both heartburn and regurgitation with the use of omeprazole 20 mg once daily. Prior evaluation includes upper endoscopy performed through [facility] that was notable for a large 7 cm hiatal hernia. Subsequent upper GI series and esophagram was notable for the moderate size hiatal hernia as well as esophageal dysmotility with what was described as proximal escape and stasis. Additionally, Cristobal has remote diagnosis of nutcracker esophagus from the [facility] in the mid 1990s.     Today Cristobal reports that his symptoms are overall stable.  He has since met with thoracic surgeon Dr. Davidson 4/23/2024 and has been scheduled for robotic assisted laparoscopic hiatal hernia repair, possible gastroplasty, possible gastrostomy and fundoplication on 8/16/2024.    Differential includes dysphagia secondary to the large hiatal hernia vs esophageal dysmotility.    - Await final results of high resolution manometry study.  After brief review while in office findings would not be consistent with hypercontractile esophagus  - Esophagram with upper GI series scheduled 9/9/2024  - Continue Omeprazole 20mg which is best taken on empty stomach 30 to 60 minutes prior to the first meal of the day  - Reflux friendly lifestyle modifications as directed within the AVS  - With emphasis placed on refraining from eating solid foods 3-4 hours prior to bed and sleep with the head in the bed elevated to at least 30 degrees or with a wedge " pillow  - Follow-up in office in 6 months which should be after your surgical intervention only if you are continuing to have GI related concerns    #Colorectal Cancer Screening   Colonoscopy 2021 that was unremarkable. Family history pertient for father with CRC. Per the most recent update by the [place] Multi-Society Task Force on Colorectal Cancer recall should be 5 years, 2026 or sooner if otherwise indicated.     Follow up plan:   Return to clinic 6 months and as needed.    The risks and benefits of my recommendations, as well as other treatment options were discussed with the patient and any available family today. All questions were answered.     Follow up: As planned above. Today, I personally spent 27 minutes in direct face to face time with the patient, of which greater than 50% of the time was spent in patient education and counseling as described above. Approximately 12 minutes were spent on indirect care associated with the patient's consultation including but not limited to review of: patient medical records to date, clinic visits, hospital records, lab results, imaging studies, procedural documentation, and coordinating care with other providers. The findings from this review are summarized in the above note. All of the above accounted for a cumulative time of 39 minutes and was performed on the date of service.     The patient verbalized understanding of the plan and was appreciative for the time spent and information provided during the office visit.           Macey Hui PA-C  Division of Gastroenterology, Hepatology, and Nutrition  [facility]       Documentation assisted by voice recognition and documentation system.

## 2024-06-14 NOTE — LETTER
6/14/2024      Flaco Hickman  [address]  [facility] [address]      Dear Colleague,    Thank you for referring your patient, Flaco Hickman, to the [facility] [place]. Please see a copy of my visit note below.      Gastroenterology Visit for: Flaco Hickman 1950   MRN: [id number]     Reason for Visit:  chief complaint    Referred by: Hayden  / [facility] [address] [facility] / [facility] [facility]  Patient Care Team:  Macey Hui PA-C as PCP - General (Gastroenterology)  Darren Davidson MD as MD (Cardiovascular & Thoracic Surgery)  Macey Hui PA-C as Physician Assistant (Gastroenterology)  Macey Hui PA-C as Assigned Gastroenterology Provider  Darren Davidson MD as Assigned Heart and Vascular Provider    History of Present Illness:   Flaco Hickman is 74 year old male with significant past medical history pertinent for HTN, hypothyroidism who is presenting as a follow-up patient with a chief complaint of dysphagia.    -----------------------------------------------------------------------------------------------------------------------------------------------------------------------------------------------------------------------------------  Interval History June 14, 2024:    Today Flaco reports that his symptoms are overall stable.  His chief concern is that of dysphagia which is predominantly to solids and intermittently to semisolids, liquids and pills.  He denies classic symptoms of reflux including heartburn/regurgitation.  His dysphagia symptoms are occurring daily.  He notes that after he has a food bolus sensation he often has regurgitation/expulsion of the previously consumed food particle.  Often this resulted in emesis which is not self-induced.    Denies weight loss, nausea, odynophagia, abdominal pain, diarrhea, constipation, melena, hematochezia and BRBR.  "    ----------------------------------------------------------------------------------------------------------------------------------------------------------------------------------------------  3/2024 HPI:     With prior remote diagnosis of \"Nutcracker Esophagus\" from [facility] ER. He was subsequently prescribed Reglan and Nitroglycerin.     When asked to describe his symptoms today Cristobal states \"when I eat it will get stuck and then I will have to vomit.\" This is predominately with solids with intermittent dysphagia to semisolids, liquids and pills. Onset was approximately 1 year prior and symptoms have been progressive. Now dysphagia occurring every other day if not every day. When this occurs Cristobal has to stop eating and regurgitate/emesis the previously consumed food particles.     Denies classic symptoms of reflux including heartburn and regurgitation.    Denies weight loss, nausea, odynophagia, dysphonia/hoarseness, heartburn, regurgitation, abdominal pain, early satiation, early satiety, diarrhea, constipation (< 3 stools per week), incontinence of feces, melena, hematochezia and BRBR.     Father had colon cancer diagnosed in early 70s.    No additional family history or GI related malignancy (esophageal, gastric, pancreatic, liver or colon).       Esophageal Questionnaire(s)    BEDQ Questionnaire      3/16/2024     9:34 PM 4/30/2024    10:23 PM 6/10/2024    11:50 PM   BEDQ Questionnaire: How Often Have You Had the Following?   Trouble eating solid food (meat, bread, vegetables) 4 4 3   Trouble eating soft foods (yogurt, jello, pudding) 1 4 2   Trouble swallowing liquids 2 3 3   Pain while swallowing 2 0 0   Coughing or choking while swallowing foods or liquids 4 4 3   Total Score: 13 15 11         3/16/2024     9:34 PM 4/30/2024    10:23 PM 6/10/2024    11:50 PM   BEDQ Questionnaire: Discomfort/Pain Ratings   Eating solid food (meat, bread, vegetables) 4 3 3   Eating soft foods (yogurt, jello, pudding) " 2 3 3   Drinking liquid 3 2 2   Total Score: 9 8 8       Eckardt Questionnaire      3/16/2024     9:37 PM 4/30/2024    10:24 PM 6/10/2024    11:53 PM   Eckardt Questionnaire   Dysphagia 1 2 2   Regurgitation 1 2 2   Retrosternal Pain 0 0 0   Weight Loss (kg) 0 0 0   Total Score:  2 4 4       Promis 10 Questionnaire      3/16/2024     9:40 PM 4/30/2024    10:27 PM 6/10/2024    11:56 PM   PROMIS 10 FLOWSHEET DATA   In general, would you say your health is: 2 2 2   In general, would you say your quality of life is: 2 2 2   In general, how would you rate your physical health? 2 2 2   In general, how would you rate your mental health, including your mood and your ability to think? 2 2 2   In general, how would you rate your satisfaction with your social activities and relationships? 2 2 3   In general, please rate how well you carry out your usual social activities and roles. (This includes activities at home, at work and in your community, and responsibilities as a parent, child, spouse, employee, friend, etc.) 3 3 3   To what extent are you able to carry out your everyday physical activities such as walking, climbing stairs, carrying groceries, or moving a chair? 4 3 3   In the past 7 days, how often have you been bothered by emotional problems such as feeling anxious, depressed, or irritable? 4 4 4   In the past 7 days, how would you rate your fatigue on average? 2 3 2   In the past 7 days, how would you rate your pain on average, where 0 means no pain, and 10 means worst imaginable pain? 5 4 4   Mental health question re-calculation - no clinical value 2 2 2   Physical health question re-calculation - no clinical value 4 3 4   Pain question re-calculation - no clinical value 3 3 3   Global Mental Health Score 8 8 9   Global Physical Health Score 13 11 12   PROMIS TOTAL - SUBSCORES 21 19 21       STUDIES & PROCEDURES:    EGD:     11/2023 [facility]  Findings:        hiatal hernia : hill grade 4 , 38 cm hiatus  measure at        Z line 31 cm        GE junction : cold forcep biopsy x 4        Antral biopsy: cold forcep biopsy x 2        Evidence of chronic gastritis in stomach   Impression:            - specimens collected.     Recommendation:        - Await pathology results. Will order upper GI for                          evaluation of dysphagia     Final Diagnosis     A. ESOPHAGOGASTRIC JUNCTION, BIOPSY  --SQUAMOUS AND GASTRIC MUCOSA WITH CHANGES CONSISTENT WITH REFLUX  --DETACHED FRAGMENT OF SQUAMOUS EPITHELIUM WITH FUNGAL FORMS CONSISTENT WITH CANDIDA SPECIES  --SEE COMMENT     B. STOMACH, ANTRUM, BIOPSY  --BODY-TYPE GASTRIC MUCOSA WITH FOCAL CHRONIC INFLAMMATION  --NO HELICOBACTER PYLORI IDENTIFIED       Electronically signed by Kari Olvera MD on 11/6/2023 at  1:16 PM   Comment     Given the detached nature of the squamous epithelium, the possibility of oropharyngeal contamination cannot be excluded       11/2021  Component 2 yr ago   Final Diagnosis     A. GASTRIC MUCOSA, BIOPSY   --BENIGN GASTRIC MUCOSA WITH FEATURES OF REACTIVE GASTROPATHY   --NEGATIVE FOR HELICOBACTER ORGANISMS BY IMMUNOHISTOCHEMICAL STAIN   --NO EVIDENCE OF DYSPLASIA OR MALIGNANCY      B. GASTRIC FUNDIC POLYP, BIOPSY   --BENIGN FUNDIC GLAND POLYP  --NO EVIDENCE OF DYSPLASIA OR MALIGNANCY         Colonoscopy:    11/2021 [facility]   Findings:        The perianal and digital rectal examinations were normal. Pertinent        negatives include normal sphincter tone, no palpable rectal lesions and        no anal lesion or abnormality.        External and internal hemorrhoids were found during retroflexion, during        perianal exam, during digital exam and during endoscopy. The hemorrhoids        were medium-sized.        A few small-mouthed diverticula were found in the sigmoid colon.        The terminal ileum appeared normal.     Impression:            - External and internal hemorrhoids.                          -  Diverticulosis in the sigmoid colon.                          - The examined portion of the ileum was normal.                          - No specimens collected.     Colorectal cancer in father, Last colonoscopy: FIve                          years ago at the [facility].       EndoFLIP directed at the UES or LES (8cm (EF-325) balloon length or 16cm (EF-322) balloon length):   Date:  8cm balloon  Balloon inflation Balloon pressure CSA (mm^2) DI (mm^2/mmHg) Dmin (mm) Compliance   20 (ladmark ID)        30        40        50           16cm balloon  Balloon inflation Balloon pressure CSA (mm^2) DI (mm^2/mmHg) Dmin (mm) Compliance   30 (ladmark ID)        40        50        60        70           High Resolution Manometry:    PH/Impedance:     Bravo:    CT:    Esophagram:    UGI and Esophagram   FINDINGS:   Following administration of effervescent crystals, barium was given.  The no   discrete mucosal abnormality along the course of the esophagus.  There is   moderate esophageal dysmotility showing tertiary contractions and proximal   escape with stasis.  Moderate-sized hiatal hernia.  Duodenum and proximal   jejunum appear normal.  No visible gastroesophageal reflux elicited.  Barium   tablet passes through the gastroesophageal junction without delay.   image   shows multilevel spondylosis and degenerative curvature of the spine.     IMPRESSION:   1. Moderate-sized hiatal hernia.   2. Normal appearance of the proximal small bowel.   3. Moderate esophageal dysmotility with tertiary contractions, proximal escape,   and stasis.      FL VSS:     GES:    U/S:     XRAY:    Other:       Prior medical records were reviewed including, but not limited to, notes from referring providers, lab work, radiographic tests, and other diagnostic tests. Pertinent results were summarized above.     History     Past Medical History:   Diagnosis Date    Allergies     Depression     Hypercholesteremia     Hypothyroidism     Nerve pain      right hand       Past Surgical History:   Procedure Laterality Date    BLEPHAROPLASTY, BROW LIFT, COMBINED Bilateral 11/15/2021    Procedure: Both upper eyelid blepharoplasty and direct browplasty;  Surgeon: Desirae Sanchez MD;  Location:  OR       Social History     Socioeconomic History    Marital status:      Spouse name: Not on file    Number of children: Not on file    Years of education: Not on file    Highest education level: Not on file   Occupational History    Not on file   Tobacco Use    Smoking status: Former    Smokeless tobacco: Never   Substance and Sexual Activity    Alcohol use: Not on file    Drug use: Not on file    Sexual activity: Not on file   Other Topics Concern    Not on file   Social History Narrative    Not on file     Social Determinants of Health     Financial Resource Strain: Not on file   Food Insecurity: Not on file   Transportation Needs: Not on file   Physical Activity: Not on file   Stress: Not on file   Social Connections: Not on file   Interpersonal Safety: Not At Risk (1/1/2024)    [facility]    Intimate Partner Violence     Physically hurt, threatened and/or made to feel afraid: No   Housing Stability: Not on file       No family history on file.  Family history reviewed and edited as appropriate    Medications and Allergies:     Outpatient Encounter Medications as of 6/14/2024   Medication Sig Dispense Refill    acetaminophen (TYLENOL) 325 MG tablet Take 325-650 mg by mouth      ascorbic acid (VITAMIN C) 250 MG CHEW chewable tablet       atorvastatin (LIPITOR) 80 MG tablet Take 40 mg by mouth      buPROPion (ZYBAN) 150 MG 12 hr tablet       busPIRone (BUSPAR) 15 MG tablet       cetirizine (ZYRTEC) 10 MG tablet Take 10 mg by mouth      cholecalciferol (VITAMIN D3) 25 mcg (1000 units) capsule Take 1 capsule by mouth      gabapentin (NEURONTIN) 300 MG capsule Take 600 mg by mouth      hypromellose-dextran (ARTIFICAL TEARS) 0.1-0.3 %  "ophthalmic solution Apply 1 drop to eye      levothyroxine (SYNTHROID/LEVOTHROID) 75 MCG tablet Take 75 mcg by mouth      loratadine (CLARITIN) 10 MG tablet Take 10 mg by mouth      omeprazole (PRILOSEC) 20 MG DR capsule Take 20 mg by mouth      predniSONE (DELTASONE) 20 MG tablet Take 20 mg by mouth      vitamin B complex with vitamin C (VITAMIN  B COMPLEX) tablet        No facility-administered encounter medications on file as of 6/14/2024.        Allergies   Allergen Reactions    Aspirin Nausea and Vomiting    Nsaids Other (See Comments)     Causes ulcers    Diclofenac      Other reaction(s): Unknown Reaction    Latex Other (See Comments)     HIVES        Review of systems:  A full 10 point review of systems was obtained and was negative except for the pertinent positives and negatives stated within the HPI.    Objective Findings:   Physical Exam:    Constitutional: BP (!) 145/89   Pulse 74   Ht 1.702 m (5' 7\")   Wt 76.2 kg (168 lb)   SpO2 98%   BMI 26.31 kg/m    General: Alert, cooperative, no distress, well-appearing  Head: Atraumatic, normocephalic, no obvious abnormalities   Eyes: Sclera anicteric, no obvious conjunctival hemorrhage   Nose: Nares normal, no obvious malformation, no obvious rhinorrhea   Respiratory: Resting comfortably, no apparent distress, no cough.   Skin: No jaundice, no obvious rash  Neurologic: AAOx3, no obvious neurologic abnormality  Psychiatric: Normal Affect, appropriate mood  Extremities: No obvious edema, no obvious malformation     Labs, Radiology, Pathology     No results found for: \"WBC\", \"HGB\", \"PLT\", \"CHOL\", \"TRIG\", \"HDL\", \"LDLDIRECT\", \"ALT\", \"AST\", \"NA\", \"CREATININE\", \"BUN\", \"CO2\", \"TSH\", \"INR\", \"GLUF\"     Liver Function Studies - No results for input(s): \"PROTTOTAL\", \"ALBUMIN\", \"BILITOTAL\", \"ALKPHOS\", \"AST\", \"ALT\", \"BILIDIRECT\" in the last 45530 hours.       Patient Active Problem List    Diagnosis Date Noted    Dermatochalasis of both upper eyelids 09/22/2021     " "Priority: Medium     Added automatically from request for surgery [id number]      Brow ptosis, bilateral 09/22/2021     Priority: Medium     Added automatically from request for surgery [id number]        Assessment and Plan   Assessment/Plan:    Flaco Hickman is 74 year old male with significant past medical history pertinent for HTN, hypothyroidism who is presenting as a follow-up patient with a chief complaint of dysphagia.    #Dysphagia - Esophageal   #Hiatal Hernia - Large 7 cm   #History of \"Nutcracker esophagus\"  Initially Cristobal presented to clinic 3/19/2024 with a 1 year history of progressive dysphagia now occurring daily.  Predominant the dysphagia is to solids with intermittent difficulties with semisolid, liquids and pills. He denies classic symptoms of reflux including both heartburn and regurgitation with the use of omeprazole 20 mg once daily. Prior evaluation includes upper endoscopy performed through [facility] that was notable for a large 7 cm hiatal hernia. Subsequent upper GI series and esophagram was notable for the moderate size hiatal hernia as well as esophageal dysmotility with what was described as proximal escape and stasis. Additionally, Cristobal has remote diagnosis of nutcracker esophagus from the [facility] in the mid 1990s.     Today Cristobal reports that his symptoms are overall stable.  He has since met with thoracic surgeon Dr. Davidson 4/23/2024 and has been scheduled for robotic assisted laparoscopic hiatal hernia repair, possible gastroplasty, possible gastrostomy and fundoplication on 8/16/2024.    Differential includes dysphagia secondary to the large hiatal hernia vs esophageal dysmotility.    - Await final results of high resolution manometry study.  After brief review while in office findings would not be consistent with hypercontractile esophagus  - Esophagram with upper GI series scheduled 9/9/2024  - Continue Omeprazole 20mg which is best taken on " empty stomach 30 to 60 minutes prior to the first meal of the day  - Reflux friendly lifestyle modifications as directed within the AVS  - With emphasis placed on refraining from eating solid foods 3-4 hours prior to bed and sleep with the head in the bed elevated to at least 30 degrees or with a wedge pillow  - Follow-up in office in 6 months which should be after your surgical intervention only if you are continuing to have GI related concerns    #Colorectal Cancer Screening   Colonoscopy 2021 that was unremarkable. Family history pertient for father with CRC. Per the most recent update by the [place] Multi-Society Task Force on Colorectal Cancer recall should be 5 years, 2026 or sooner if otherwise indicated.     Follow up plan:   Return to clinic 6 months and as needed.    The risks and benefits of my recommendations, as well as other treatment options were discussed with the patient and any available family today. All questions were answered.     Follow up: As planned above. Today, I personally spent 27 minutes in direct face to face time with the patient, of which greater than 50% of the time was spent in patient education and counseling as described above. Approximately 12 minutes were spent on indirect care associated with the patient's consultation including but not limited to review of: patient medical records to date, clinic visits, hospital records, lab results, imaging studies, procedural documentation, and coordinating care with other providers. The findings from this review are summarized in the above note. All of the above accounted for a cumulative time of 39 minutes and was performed on the date of service.     The patient verbalized understanding of the plan and was appreciative for the time spent and information provided during the office visit.       Documentation assisted by voice recognition and documentation system.            Again, thank you for allowing me to participate in the care of your  patient.      Sincerely,    Macey Hui PA-C

## 2024-06-20 DIAGNOSIS — K44.9 HIATAL HERNIA: Primary | ICD-10-CM

## 2024-06-20 DIAGNOSIS — R13.19 ESOPHAGEAL DYSPHAGIA: ICD-10-CM

## 2024-07-01 ENCOUNTER — ANCILLARY PROCEDURE (OUTPATIENT)
Dept: GENERAL RADIOLOGY | Facility: CLINIC | Age: 74
End: 2024-07-01
Attending: PHYSICIAN ASSISTANT
Payer: COMMERCIAL

## 2024-07-01 DIAGNOSIS — R13.19 ESOPHAGEAL DYSPHAGIA: ICD-10-CM

## 2024-07-01 DIAGNOSIS — K44.9 HIATAL HERNIA: ICD-10-CM

## 2024-07-01 PROCEDURE — 74220 X-RAY XM ESOPHAGUS 1CNTRST: CPT | Mod: GC | Performed by: RADIOLOGY

## 2024-07-11 NOTE — TELEPHONE ENCOUNTER
Called and spoke with pt to let him know his 8/16 surgery date will most likely need to be postponed due to cancer pt that needs to be added on in about 5 weeks. Writer offered pt to move surgery date up to 7/19. Pt declined stating he has plans and can not move up. Pt states he understands his 8/16 surgery date may need to be postponed but that he is currently still scheduled. Pt will wait for writers call back to let him know if this has to actually be moved,.     Araceli Ely on 7/11/2024 at 12:12 PM

## 2024-07-15 ENCOUNTER — PATIENT OUTREACH (OUTPATIENT)
Dept: SURGERY | Facility: CLINIC | Age: 74
End: 2024-07-15
Payer: MEDICARE

## 2024-07-15 ENCOUNTER — NURSE TRIAGE (OUTPATIENT)
Dept: GASTROENTEROLOGY | Facility: CLINIC | Age: 74
End: 2024-07-15
Payer: MEDICARE

## 2024-07-15 NOTE — TELEPHONE ENCOUNTER
Pt's [facility] is [facility]  without a request from Dr. Davidson's office.  She is worried because [facility] is [facility] until mid August after this week.  Pt is scheduled for surgery on 8/16.    This writer spoke to  3 times but was unable to get the information d/t phone connection problems.  Sent pt MyC message so [facility] number to the [facility] in [place] can be forwarded to Care Team.    Received message from  that Dasia called her and everything is taken care of.

## 2024-07-16 ENCOUNTER — PATIENT OUTREACH (OUTPATIENT)
Dept: SURGERY | Facility: CLINIC | Age: 74
End: 2024-07-16
Payer: MEDICARE

## 2024-07-16 ENCOUNTER — DOCUMENTATION ONLY (OUTPATIENT)
Dept: SURGERY | Facility: CLINIC | Age: 74
End: 2024-07-16
Payer: MEDICARE

## 2024-07-16 DIAGNOSIS — Z01.818 PREOP TESTING: ICD-10-CM

## 2024-07-16 DIAGNOSIS — K21.9 HIATAL HERNIA WITH GASTROESOPHAGEAL REFLUX: Primary | ICD-10-CM

## 2024-07-16 DIAGNOSIS — K44.9 HIATAL HERNIA WITH GASTROESOPHAGEAL REFLUX: Primary | ICD-10-CM

## 2024-07-16 NOTE — PROGRESS NOTES
Spoke with  as there was a lot of back and forth about where to send Cristobal's lab work orders. The [facility] did not receive a fax from us with the lab work orders. Writer has tried to fax the orders 4 times and will not go through. Writer will try faxing the orders 1 more time to [phone] and [phone] and if it does not go through will fax the orders to [facility].  stated the [facility] was rude to patient and appreciated writer's kindness during this process and trying to get things figured out. No other questions or concerns.

## 2024-07-19 ENCOUNTER — TELEPHONE (OUTPATIENT)
Dept: GASTROENTEROLOGY | Facility: CLINIC | Age: 74
End: 2024-07-19
Payer: MEDICARE

## 2024-07-19 NOTE — TELEPHONE ENCOUNTER
[facility] Call Center    Phone Message    May a detailed message be left on voicemail: yes     Reason for Call: Form or Letter   Type or form/letter needing completion: Request for Service Form needs to be at the end of every authorization. For needing more services.  Provider: Macey Hui  Date form needed: ASAP  Once completed: Fax form to: [phone]    Action Taken: Message routed to:  [facility] GI    Travel Screening: Not Applicable     Date of Service:

## 2024-07-19 NOTE — Clinical Note
Talia,   Thanks for your hard work on this one. I will fill out the documents tomorrow therefore it can be faxed to the appropriate individuals.   Macey

## 2024-07-30 ENCOUNTER — TELEPHONE (OUTPATIENT)
Dept: GASTROENTEROLOGY | Facility: CLINIC | Age: 74
End: 2024-07-30
Payer: MEDICARE

## 2024-07-30 ENCOUNTER — MYC MEDICAL ADVICE (OUTPATIENT)
Dept: GASTROENTEROLOGY | Facility: CLINIC | Age: 74
End: 2024-07-30
Payer: MEDICARE

## 2024-07-30 NOTE — TELEPHONE ENCOUNTER
[facility] Call Center    Phone Message    May a detailed message be left on voicemail: yes     Reason for Call: Other: Requesting a call back in regards [place]      Action Taken:[facility] gi     Travel Screening: Not Applicable     Date of Service:

## 2024-08-05 PROBLEM — K22.4 NUTCRACKER ESOPHAGUS: Status: ACTIVE | Noted: 2024-08-05

## 2024-08-05 PROBLEM — Z87.2: Status: ACTIVE | Noted: 2024-08-05

## 2024-08-05 PROBLEM — H35.3130 BILATERAL NONEXUDATIVE AGE-RELATED MACULAR DEGENERATION: Status: ACTIVE | Noted: 2024-08-05

## 2024-08-05 PROBLEM — R21 RASH AND OTHER NONSPECIFIC SKIN ERUPTION: Status: ACTIVE | Noted: 2024-08-05

## 2024-08-05 PROBLEM — T15.02XA FOREIGN BODY OF LEFT CORNEA: Status: ACTIVE | Noted: 2024-08-05

## 2024-08-05 PROBLEM — R51.9 HEADACHE: Status: ACTIVE | Noted: 2024-08-05

## 2024-08-05 PROBLEM — K21.9 GASTROESOPHAGEAL REFLUX DISEASE: Status: ACTIVE | Noted: 2024-08-05

## 2024-08-05 PROBLEM — M19.039 LOCALIZED PRIMARY OSTEOARTHRITIS OF WRIST: Status: ACTIVE | Noted: 2024-08-05

## 2024-08-05 PROBLEM — K44.9 HIATAL HERNIA: Status: ACTIVE | Noted: 2024-08-05

## 2024-08-05 PROBLEM — K40.90 LEFT INGUINAL HERNIA: Status: ACTIVE | Noted: 2024-08-05

## 2024-08-05 PROBLEM — R09.02 HYPOXEMIA: Status: ACTIVE | Noted: 2024-08-05

## 2024-08-05 PROBLEM — G43.909 MIGRAINE HEADACHE: Status: ACTIVE | Noted: 2024-08-05

## 2024-08-05 PROBLEM — M54.50 LOW BACK PAIN, UNSPECIFIED: Status: ACTIVE | Noted: 2024-08-05

## 2024-08-05 PROBLEM — F32.9 MAJOR DEPRESSION: Status: ACTIVE | Noted: 2024-08-05

## 2024-08-05 PROBLEM — M20.20 ACQUIRED HALLUX RIGIDUS: Status: ACTIVE | Noted: 2024-08-05

## 2024-08-05 PROBLEM — M25.511 PAIN IN RIGHT SHOULDER: Status: ACTIVE | Noted: 2024-08-05

## 2024-08-05 PROBLEM — L50.1 IDIOPATHIC URTICARIA: Chronic | Status: ACTIVE | Noted: 2024-08-05

## 2024-08-05 PROBLEM — H04.123 DRY EYES: Status: ACTIVE | Noted: 2024-08-05

## 2024-08-05 PROBLEM — J44.89 OTHER SPECIFIED CHRONIC OBSTRUCTIVE PULMONARY DISEASE (H): Status: ACTIVE | Noted: 2024-08-05

## 2024-08-05 PROBLEM — M19.90 OSTEOARTHRITIS: Status: ACTIVE | Noted: 2024-08-05

## 2024-08-05 PROBLEM — D64.9 ANEMIA: Status: ACTIVE | Noted: 2022-12-01

## 2024-08-05 PROBLEM — H51.11 CONVERGENCE INSUFFICIENCY: Status: ACTIVE | Noted: 2024-08-05

## 2024-08-05 PROBLEM — F43.10 PTSD (POST-TRAUMATIC STRESS DISORDER): Status: ACTIVE | Noted: 2024-08-05

## 2024-08-05 PROBLEM — F33.0 MAJOR DEPRESSIVE DISORDER, RECURRENT, MILD (H): Status: ACTIVE | Noted: 2024-08-05

## 2024-08-05 PROBLEM — M79.5 FOREIGN BODY (FB) IN SOFT TISSUE: Status: ACTIVE | Noted: 2023-06-19

## 2024-08-05 PROBLEM — H01.009 BLEPHARITIS: Status: ACTIVE | Noted: 2024-08-05

## 2024-08-05 PROBLEM — J30.9 ALLERGIC RHINITIS: Status: ACTIVE | Noted: 2024-08-05

## 2024-08-05 PROBLEM — F43.12 POST-TRAUMATIC STRESS DISORDER, CHRONIC: Status: ACTIVE | Noted: 2024-08-05

## 2024-08-05 PROBLEM — M54.2 CERVICALGIA: Status: ACTIVE | Noted: 2024-08-05

## 2024-08-05 PROBLEM — H91.90 UNSPECIFIED HEARING LOSS, UNSPECIFIED EAR: Status: ACTIVE | Noted: 2024-08-05

## 2024-08-05 PROBLEM — H52.4 PRESBYOPIA: Status: ACTIVE | Noted: 2024-08-05

## 2024-08-05 PROBLEM — H93.19 SUBJECTIVE TINNITUS: Status: ACTIVE | Noted: 2024-08-05

## 2024-08-05 PROBLEM — R03.0 ELEVATED BLOOD-PRESSURE READING, WITHOUT DIAGNOSIS OF HYPERTENSION: Status: ACTIVE | Noted: 2024-08-05

## 2024-08-05 PROBLEM — M75.42 IMPINGEMENT SYNDROME OF LEFT SHOULDER REGION: Status: ACTIVE | Noted: 2024-08-05

## 2024-08-05 PROBLEM — G89.4 CHRONIC PAIN SYNDROME: Status: ACTIVE | Noted: 2024-08-05

## 2024-08-05 PROBLEM — F41.9 ANXIETY: Status: ACTIVE | Noted: 2024-08-05

## 2024-08-05 PROBLEM — F43.10 POSTTRAUMATIC STRESS DISORDER: Status: ACTIVE | Noted: 2024-08-05

## 2024-08-05 PROBLEM — T78.3XXA ANGIOEDEMA: Status: ACTIVE | Noted: 2024-08-05

## 2024-08-05 PROBLEM — G56.00 CARPAL TUNNEL SYNDROME: Status: ACTIVE | Noted: 2024-08-05

## 2024-08-05 PROBLEM — R68.89 OTHER GENERAL SYMPTOMS AND SIGNS: Status: ACTIVE | Noted: 2024-08-05

## 2024-08-05 PROBLEM — H90.5 SENSORINEURAL HEARING LOSS OF COMBINED SITES: Status: ACTIVE | Noted: 2024-08-05

## 2024-08-05 PROBLEM — E03.9 HYPOTHYROIDISM: Status: ACTIVE | Noted: 2024-08-05

## 2024-08-05 PROBLEM — Z86.0100 HISTORY OF COLONIC POLYPS: Status: ACTIVE | Noted: 2024-08-05

## 2024-08-05 PROBLEM — M79.644 PAIN IN RIGHT FINGER(S): Status: ACTIVE | Noted: 2024-08-05

## 2024-08-05 PROBLEM — M75.42 IMPINGEMENT SYNDROME OF LEFT SHOULDER: Status: ACTIVE | Noted: 2024-08-05

## 2024-08-05 PROBLEM — R06.83 SNORING: Status: ACTIVE | Noted: 2024-08-05

## 2024-08-05 PROBLEM — H26.9 CATARACT OF BOTH EYES: Status: ACTIVE | Noted: 2024-08-05

## 2024-08-05 PROBLEM — Z77.29 EXPOSURE TO POTENTIALLY HAZARDOUS SUBSTANCE: Status: ACTIVE | Noted: 2024-08-05

## 2024-08-05 PROBLEM — C43.9 MELANOMA (H): Status: ACTIVE | Noted: 2024-08-05

## 2024-08-05 PROBLEM — F03.A0: Status: ACTIVE | Noted: 2024-08-05

## 2024-08-05 PROBLEM — F33.0 MILD EPISODE OF RECURRENT MAJOR DEPRESSIVE DISORDER (H): Status: ACTIVE | Noted: 2024-08-05

## 2024-08-05 PROBLEM — Z77.29 CONTACT WITH AND (SUSPECTED) EXPOSURE TO OTHER HAZARDOUS SUBSTANCES: Status: ACTIVE | Noted: 2024-08-05

## 2024-08-05 PROBLEM — R94.31 ABNORMAL AMBULATORY ELECTROCARDIOGRAPHY: Status: ACTIVE | Noted: 2024-08-05

## 2024-08-05 PROBLEM — R05.3 CHRONIC COUGH: Status: ACTIVE | Noted: 2024-08-05

## 2024-08-05 PROBLEM — H50.52 EXOPHORIA: Status: ACTIVE | Noted: 2024-08-05

## 2024-08-06 NOTE — TELEPHONE ENCOUNTER
PAULA Gutierrez in [facility] on 8/6 at 9:25am.     Needing Request for Service form completed by Macey Hui for upcoming hernia repair.    Reference Number:   Once completed fax to [phone]    Talia Daly    [facility] Reference #: [id number]

## 2024-08-07 ENCOUNTER — TELEPHONE (OUTPATIENT)
Dept: GASTROENTEROLOGY | Facility: CLINIC | Age: 74
End: 2024-08-07
Payer: MEDICARE

## 2024-08-07 NOTE — TELEPHONE ENCOUNTER
[facility]renda [facility] called and is confused on what forms/documentation needed prior to surgery on 8/16. Terri belived patient is confused in coordination.     Brenda Request ICD Code for Upcoming Hiatal Hernia Repair.     RN Notified    Brenda at [facility]: [phone] Ext [phone]    Talia [facility]

## 2024-08-08 ENCOUNTER — TELEPHONE (OUTPATIENT)
Dept: GASTROENTEROLOGY | Facility: CLINIC | Age: 74
End: 2024-08-08
Payer: MEDICARE

## 2024-08-08 NOTE — TELEPHONE ENCOUNTER
Terri from [facility] called back asking for a status update on Request for Service form.     Requested form be faxed over ASA and marked urgent.     Brenda requests Pre-Op date and Surgery Date be marked on the form in comments section.     Once completed requested faxed back to [phone]    Provider notified.    Talia Daly

## 2024-08-08 NOTE — TELEPHONE ENCOUNTER
Returned call to   [facility] (Other) [phone]     PA is pending for 8/16, [facility] has all forms needed and expect to have authorization for their procedure.    Pt called with the update.

## 2024-08-08 NOTE — TELEPHONE ENCOUNTER
Reached out to [facility] staff to clarify authorization needs.  Left detailed vm as staff is currently not available.  Emailed current form to provider as requesting for signing and faxing.    Brenda Gonzalez @ [phone] extension [phone]

## 2024-08-08 NOTE — TELEPHONE ENCOUNTER
Call to patient to inform that documentation was received from the [facility] and has been filled out and faxed back.  Recommended contacting the [facility] billing prior to proceeding with surgical intervention next week.

## 2024-08-08 NOTE — TELEPHONE ENCOUNTER
From my review the initial call came on 7/19/2024 and was not addressed until 8/2/2024 while I was away. Since it appears as if Talia has been involved and was able to obtain the form. Talia would you please be able to print this form therefore I can sign it out tomorrow  (8/8/2024) as I am out again on Friday and the surgery is scheduled for 8/16/2024 which is next Friday.

## 2024-08-08 NOTE — TELEPHONE ENCOUNTER
Faxed completed Request for Service Form along with following records on 8/8 on 10:15am.     Records Included:   High Resolution Esophagram - 5/2/24  Esophogram 4/23/24   CT Chest W/O Contrast - 4/16/24  Office Note Dr. Davidson - 4/23/24  Office Note Macey Hui - 6/14/04, 3/19/24    Called Brenda with [facility] notifying her records and completed form have been faxed over.     Fax: [phone]    Talia Daly

## 2024-08-09 ENCOUNTER — TRANSFERRED RECORDS (OUTPATIENT)
Dept: HEALTH INFORMATION MANAGEMENT | Facility: CLINIC | Age: 74
End: 2024-08-09
Payer: MEDICARE

## 2024-08-09 PROBLEM — F43.10 PTSD (POST-TRAUMATIC STRESS DISORDER): Status: RESOLVED | Noted: 2024-08-05 | Resolved: 2024-08-09

## 2024-08-09 PROBLEM — F43.12 POST-TRAUMATIC STRESS DISORDER, CHRONIC: Status: RESOLVED | Noted: 2024-08-05 | Resolved: 2024-08-09

## 2024-08-09 PROBLEM — K21.9 GASTROESOPHAGEAL REFLUX DISEASE: Status: RESOLVED | Noted: 2024-08-05 | Resolved: 2024-08-09

## 2024-08-12 ENCOUNTER — DOCUMENTATION ONLY (OUTPATIENT)
Dept: SURGERY | Facility: CLINIC | Age: 74
End: 2024-08-12

## 2024-08-12 ENCOUNTER — PRE VISIT (OUTPATIENT)
Dept: SURGERY | Facility: CLINIC | Age: 74
End: 2024-08-12

## 2024-08-12 ENCOUNTER — VIRTUAL VISIT (OUTPATIENT)
Dept: SURGERY | Facility: CLINIC | Age: 74
End: 2024-08-12
Payer: COMMERCIAL

## 2024-08-12 ENCOUNTER — ANESTHESIA EVENT (OUTPATIENT)
Dept: SURGERY | Facility: CLINIC | Age: 74
DRG: 328 | End: 2024-08-12
Payer: COMMERCIAL

## 2024-08-12 VITALS — BODY MASS INDEX: 25.27 KG/M2 | HEIGHT: 67 IN | WEIGHT: 161 LBS

## 2024-08-12 DIAGNOSIS — Z01.818 PREOP EXAMINATION: Primary | ICD-10-CM

## 2024-08-12 DIAGNOSIS — K21.9 HIATAL HERNIA WITH GASTROESOPHAGEAL REFLUX: ICD-10-CM

## 2024-08-12 DIAGNOSIS — K44.9 HIATAL HERNIA WITH GASTROESOPHAGEAL REFLUX: ICD-10-CM

## 2024-08-12 PROCEDURE — 99204 OFFICE O/P NEW MOD 45 MIN: CPT | Mod: 95 | Performed by: PHYSICIAN ASSISTANT

## 2024-08-12 RX ORDER — FERROUS SULFATE 325(65) MG
325 TABLET ORAL
COMMUNITY
Start: 2024-06-06

## 2024-08-12 RX ORDER — MEMANTINE HYDROCHLORIDE 5 MG/1
5 TABLET ORAL EVERY MORNING
COMMUNITY
Start: 2023-08-01

## 2024-08-12 RX ORDER — ESCITALOPRAM OXALATE 5 MG/1
7.5 TABLET ORAL EVERY MORNING
COMMUNITY
Start: 2023-11-01

## 2024-08-12 ASSESSMENT — COPD QUESTIONNAIRES: COPD: 1

## 2024-08-12 ASSESSMENT — PAIN SCALES - GENERAL: PAINLEVEL: MODERATE PAIN (5)

## 2024-08-12 ASSESSMENT — LIFESTYLE VARIABLES: TOBACCO_USE: 1

## 2024-08-12 ASSESSMENT — ENCOUNTER SYMPTOMS: SEIZURES: 0

## 2024-08-12 NOTE — H&P
Pre-Operative H & P     CC:  Preoperative exam to assess for increased cardiopulmonary risk while undergoing surgery and anesthesia.    Date of Encounter: 8/12/2024  Primary Care Physician:  Macey Hui     Reason for visit:   Encounter Diagnoses   Name Primary?    Hiatal hernia with gastroesophageal reflux Yes    Preop examination        HPI  Flaco Hickman is a 74 year old male who presents for pre-operative H & P in preparation for  Procedure Information       Case: [id number] Date/Time: 08/16/24 1255    Procedure: Robot-assisted laparoscopic hiatal hernia repair, partial fundoplication, possible gastroplasty, gastrostomy (Chest)    Anesthesia type: General    Diagnosis: Hiatal hernia with gastroesophageal reflux [K44.9, K21.9]    Pre-op diagnosis: Hiatal hernia with gastroesophageal reflux [K44.9, K21.9]    Location:  OR  /  OR    Providers: Darren Davidson MD            Patient is being evaluated for comorbid conditions of nocturnal oxygen dependence, HLD, 50 pk yr smoking hx, depression, severe chronic idiopathic urticaria and intermittent angioedema, PTSD, hearing loss, hypothyroidism, LBP.    Mr. Hickman has a history of dysphagia which is predominantly to solids and intermittently to semisolids, liquids and pills. His dysphagia symptoms are occurring daily. He notes that after he has a food bolus sensation he often has regurgitation/expulsion of the previously consumed food particle. His work up has confirmed a Moderate-sized hiatal hernia. He now presents for the above procedure.      History is obtained from the patient and chart review. He is accompanied by his wife.    Hx of abnormal bleeding or anti-platelet use: denies      Past Medical History  Past Medical History:   Diagnosis Date    Allergies     Dependence on nocturnal oxygen therapy     Depression     Hiatal hernia with gastroesophageal reflux     Hypercholesteremia     Hypothyroidism     Idiopathic angio-edema-urticaria      Migraine     Nerve pain     right hand    SNHL (sensorineural hearing loss)        Past Surgical History  Past Surgical History:   Procedure Laterality Date    BLEPHAROPLASTY, BROW LIFT, COMBINED Bilateral 11/15/2021    Procedure: Both upper eyelid blepharoplasty and direct browplasty;  Surgeon: Desirae Sanchez MD;  Location: [facility] OR    DENTAL SURGERY      L5 laminectomy      right total knee Right        Prior to Admission Medications  Current Outpatient Medications   Medication Sig Dispense Refill    acetaminophen (TYLENOL) 325 MG tablet Take 325-650 mg by mouth      ascorbic acid (VITAMIN C) 250 MG CHEW chewable tablet Take 250 mg by mouth every morning      atorvastatin (LIPITOR) 80 MG tablet Take 80 mg by mouth every evening      buPROPion (ZYBAN) 150 MG 12 hr tablet Take 150 mg by mouth every morning      busPIRone (BUSPAR) 15 MG tablet Take 15 mg by mouth every morning      cetirizine (ZYRTEC) 10 MG tablet Take 10 mg by mouth every morning      cholecalciferol (VITAMIN D3) 25 mcg (1000 units) capsule Take 1 capsule by mouth every morning      escitalopram (LEXAPRO) 5 MG tablet Take 7.5 mg by mouth every morning      ferrous sulfate (FEROSUL) 325 (65 Fe) MG tablet Take 325 mg by mouth daily (with breakfast)      gabapentin (NEURONTIN) 300 MG capsule Take 600 mg by mouth at bedtime      hypromellose-dextran (ARTIFICAL TEARS) 0.1-0.3 % ophthalmic solution Apply 1 drop to eye      levothyroxine (SYNTHROID/LEVOTHROID) 75 MCG tablet Take 75 mcg by mouth every morning      loratadine (CLARITIN) 10 MG tablet Take 10 mg by mouth every morning      memantine (NAMENDA) 5 MG tablet Take 5 mg by mouth every morning      omeprazole (PRILOSEC) 20 MG DR capsule Take 20 mg by mouth every morning      vitamin B complex with vitamin C (VITAMIN  B COMPLEX) tablet Take 1 tablet by mouth every morning      predniSONE (DELTASONE) 20 MG tablet Take 20 mg by mouth (Patient not taking: Reported on 8/12/2024)          Allergies  Allergies   Allergen Reactions    Latex Other (See Comments)     HIVES    Aspirin Nausea and Vomiting    Nsaids Other (See Comments)     Causes ulcers    Diclofenac      Other reaction(s): Unknown Reaction       Social History  Social History     Socioeconomic History    Marital status:      Spouse name: Not on file    Number of children: Not on file    Years of education: Not on file    Highest education level: Not on file   Occupational History    Not on file   Tobacco Use    Smoking status: Former     Types: Cigarettes    Smokeless tobacco: Never   Substance and Sexual Activity    Alcohol use: Not Currently    Drug use: Never    Sexual activity: Not on file   Other Topics Concern    Not on file   Social History Narrative    Not on file     Social Determinants of Health     Financial Resource Strain: Not on file   Food Insecurity: Not on file   Transportation Needs: Not on file   Physical Activity: Not on file   Stress: Not on file   Social Connections: Not on file   Interpersonal Safety: Not At Risk (1/1/2024)    Received from [facility] and [facility]    Intimate Partner Violence     Physically hurt, threatened and/or made to feel afraid: No   Housing Stability: Not on file       Family History  Family History   Problem Relation Age of Onset    Anesthesia Reaction No family hx of     Deep Vein Thrombosis (DVT) No family hx of        Review of Systems  The complete review of systems is negative other than noted in the HPI or here.   Anesthesia Evaluation   Pt has had prior anesthetic.     No history of anesthetic complications       ROS/MED HX  ENT/Pulmonary:     (+)                tobacco use,          COPD, O2 dependent, during Nighttime, 2L liters/min,        (-) asthma, sleep apnea and recent URI   Neurologic: Comment: Hearing loss    (+)      migraines (has received botox),                       (-) no seizures and no CVA   Cardiovascular:       METS/Exercise Tolerance: 4 -  Raking leaves, gardening Comment: Able to do yard work, shovel snow, ascend stairs. Endorses SOB w/ activity, denies CP.    Hematologic:  - neg hematologic  ROS  (-) history of blood clots and history of blood transfusion   Musculoskeletal: Comment: LBP, s/p L5 laminectomy    S/p right TKA        GI/Hepatic:     (+)      hiatal hernia,           (-) liver disease   Renal/Genitourinary:    (-) renal disease   Endo:     (+)          thyroid problem, hypothyroidism,        (-) Type II DM   Psychiatric/Substance Use: Comment: PTSD    (+) psychiatric history depression       Infectious Disease:  - neg infectious disease ROS     Malignancy:  - neg malignancy ROS     Other: Comment: Idiopathic urticaria; angioedema. Takes prednisone prn.             Virtual visit -  No vitals were obtained    Physical Exam  Constitutional: Awake, alert, cooperative, no apparent distress, and appears stated age.  HENT: Normocephalic  Respiratory: non labored breathing   Neurologic: Awake, alert, oriented to name, place and time.   Neuropsychiatric: Calm, cooperative. Normal affect.      Prior Labs/Diagnostic Studies   All labs and imaging personally reviewed       HOLTER MONITOR 2019  1. The patient underwent cardiac Holter monitoring for a period of 24:00 hours.     2. The underlying rhythm is sinus with rates varying from 45 to 106 beats per minute. The average heart rate is 66 beats per  minute.     3. There is no AV block or significant pauses seen, 25% of the time is spent in rates of 60 bpm  or less.     4. There are 7625 isolated supraventricular complexes, 20 couplets, three bigeminal cycles and two runs totaling eight beats.     5. There are 443 isolated premature ventricular complexes and one couplet.     6. The patient did not depress the event marker. the patient did note shortness of breath during the day, but did not note  times.         The patient's records and results personally reviewed by this provider.     LABS ordered  "for DOS:  BMP, T&S    Assessment    Flaco Hickman is a 74 year old male seen as a PAC referral for risk assessment and optimization for anesthesia.    Plan/Recommendations  Pt will be optimized for the proposed procedure.  See below for details on the assessment, risk, and preoperative recommendations    NEUROLOGY  - No history of TIA, CVA or seizure  - Migraines, receives botox  -Post Op delirium risk factors:  No risk identified    ENT  - No current airway concerns.  Will need to be reassessed day of surgery.  Mallampati: Unable to assess  TM: Unable to assess    CARDIAC  - No history of CAD, Hypertension, and Afib    - Holter monitor 2019:  isolated SVTs & PVCs    - METS (Metabolic Equivalents)  Able to do yard work, shovel snow. Endorses SOB w/ activity, denies CP.   Patient performs 4 or more METS exercise without symptoms             Total Score: 0      RCRI-Low risk: Class 2 0.9% complication rate             Total Score: 1    RCRI: High Risk Surgery        PULMONARY  - Uses 2L O2 at night for O2 desaturations.   - CT Scan Chest 4/16/24:  scattered <6mm B/L nodules, Focal pleural thickening at the level of the left lateral sixth and seventh ribs, Healed granuloma in the right lower lobe, Bibasilar atelectasis/scarring.  - Consider duoneb on DOS if indicated    ULISES Low Risk             Total Score: 2    ULISES: Over 50 ys old    ULISES: Male      - Denies asthma or inhaler use  - Tobacco History    History   Smoking Status    Former    Types: Cigarettes   Smokeless Tobacco    Never    * 50 pk yr smoking hx, quit 2018     GI  - Moderate/ large Hiatal hernia w/ dysphagia    PONV Medium Risk  Total Score: 2           1 AN PONV: Patient is not a current smoker    1 AN PONV: Intended Post Op Opioids          ENDOCRINE    - BMI: Estimated body mass index is 25.22 kg/m  as calculated from the following:    Height as of this encounter: 1.702 m (5' 7\").    Weight as of this encounter: 73 kg (161 lb).  Healthy Weight " (BMI 18.5-24.9)  - No history of Diabetes Mellitus  - Hypothyroidism    HEME  VTE Low Risk 0.5%             Total Score: 3    VTE: Greater than 59 yrs old    VTE: Male      - No history of abnormal bleeding or antiplatelet use.      MSK  Patient is NOT Frail             Total Score: 0      - s/p L5 laminectomy   - s/p right TKA 2016      ALLERGY/ IMMUNE  - severe chronic idiopathic urticaria and intermittent angioedema.     - Symptoms continued despite the use of antihistamines. Uses prednisone 20 mg one tablet as needed for management of the swelling of the lips or face or for significant hives. Hives are almost daily however they do not last in any area more than 24 hours, do not leave any bruising or markings. No association between the hives and any specific environmental factors, foods, or medications.     - Latex allergy - use precautions per protocol.          The patient is aware that the final anesthesia plan will be decided by the assigned anesthesia provider on the date of service.      The patient is optimized for their procedure. AVS with information on surgery time/arrival time, meds and NPO status given by nursing staff. No further diagnostic testing indicated.    Please refer to the physical examination documented by the anesthesiologist in the anesthesia record on the day of surgery.    Video-Visit Details    Type of service:  Video Visit    Provider received verbal consent for a Video Visit from the patient? Yes   Video Start Time:  0952  Video End Time: 1009    Originating Location (pt. Location): Home    Distant Location (provider location):  On-site  Mode of Communication:  Video Conference via [employer]  On the day of service:     Prep time: 13 minutes  Visit time: 17 minutes  Documentation time: 22 minutes  ------------------------------------------  Total time: 52 minutes      Batsheva Cevallos PA-C  Preoperative Assessment Center  [facility]  Clinic and Surgery Center  Phone:  [phone]  Fax: [phone]

## 2024-08-12 NOTE — PROGRESS NOTES
Pt reports he has not taken prednisone for about 8 months. Uses PRN for urticaria flares. These have been under control lately. No other questions or concerns related to upcoming surgery with Dr. Davidson. He has a virtual visit scheduled for tomorrow.    Farzaneh Sanchez, RN BSN  Thoracic Surgery RN Care Coordinator  [phone]

## 2024-08-12 NOTE — PROGRESS NOTES
Cristobal is a 74 year old who is being evaluated via a billable video visit.    How would you like to obtain your AVS? MyChart  If the video visit is dropped, the invitation should be resent by: Text to cell phone: [phone]    Subjective   Cristobal is a 74 year old, presenting for the following health issues:  Pre-Op Exam      HPI           Physical Exam

## 2024-08-12 NOTE — PATIENT INSTRUCTIONS
Preparing for Your Surgery      Name:  Flaco Hickman   MRN:  [id number]   :  1950   Today's Date:  2024       Arriving for surgery:  Surgery date:  24  Arrival time:  10:55 am  Surgery time: 12:55 pm    Please come to:     Please come to:       [facility]    [address] [place]  [address]     The [facility] Patient/Visitor Ramp is at [address]. Patients and visitors who self-park will receive the reduced hospital parking rate. If the Patient /Visitor Ramp is full, please follow the signs to the [employer] car park located at the main hospital entrance.       parking is available (24 hours/ 7 days a week)      Discounted parking pass options are available for patients and visitors. They can be purchased at the [employer] desk at the main hospital entrance.     -    Stop at the security desk and they will direct surgery patients to the Surgery Check in and Family [facility]. [phone]        - If you need directions, a wheelchair or an escort please stop at the Information/security desk in the lobby.     What can I eat or drink?  -  You may eat and drink normally up to 8 hours prior to arrival time. (Until 2:55 am)  -  You may have clear liquids until 2 hours prior to arrival time. (Until 8:55 am)    Examples of clear liquids:  Water  Clear broth  Juices (apple, white grape, white cranberry  and cider) without pulp  Noncarbonated, powder based beverages  (lemonade and Matthieu-Aid)  Sodas (Sprite, 7-Up, ginger ale and seltzer)  Coffee or tea (without milk or cream)  Gatorade    -  No Alcohol or cannabis products for at least 24 hours before surgery.     Which medicines can I take?    Hold Aspirin for 7 days before surgery.   Hold Multivitamins for 7 days before surgery.  Hold Supplements for 7 days before surgery.  Hold Ibuprofen (Advil, Motrin) for 1 day(s)  before surgery--unless otherwise directed by surgeon.  Hold Naproxen (Aleve) for 4 days before surgery.    -  DO NOT take these medications the day of surgery:  Vitamins B, C, D  Ferrous sulfate (Iron)  Cetirizine (Zyrtec)    -  PLEASE TAKE these medications per your usual routine:  Tylenol if needed  Atorvastatin (Lipitor)  Bupropion (Zyban)  Buspirone (Buspar)  Escitalopram (Lexapro)  Gabapentin (Neurontin)  Artificial tears if needed and you may bring these to the hospital. Do not bring any other medications to the hospital.  Levothyroxine  Loratadine (Claritin)  Memantine (Namenda)  Omeprazole (Prilosec)      How do I prepare myself?  - Please take 2 showers (one the night prior to surgery and one the morning of surgery) using Scrubcare or Hibiclens soap.    Use this soap only from the neck to your toes.     Leave the soap on your skin for one minute--then rinse thoroughly.      You may use your own shampoo and conditioner. No other hair products.      Sleep in clean sheets and wear clean clothes.  - Please remove all jewelry and body piercings.  - No lotions, deodorants or fragrance.  - No makeup or fingernail polish.   - Bring your ID and insurance card.    -If you use a CPAP machine, please bring the CPAP machine, tubing, and mask to hospital.    -If you have a Deep Brain Stimulator, Spinal Cord Stimulator, or any Neuro Stimulator device---you must bring the remote control to the hospital.      ALL PATIENTS GOING HOME THE SAME DAY OF SURGERY ARE REQUIRED TO HAVE A RESPONSIBLE ADULT TO DRIVE AND BE IN ATTENDANCE WITH THEM FOR 24 HOURS FOLLOWING SURGERY.    Covid testing policy as of 12/06/2022  Your surgeon will notify and schedule you for a COVID test if one is needed before surgery--please direct any questions or COVID symptoms to your surgeon      Questions or Concerns:    - For any questions regarding the day of surgery or your hospital stay, please contact the Pre Admission Nursing Office at [phone].        - If you have health changes between today and your surgery, please call your surgeon.       - For questions after surgery, please call your surgeons office.           Current Visitor Guidelines    You may have 2 visitors in the pre op area.    Visiting hours: 8 a.m. to 8:30 p.m.    Patients confirmed or suspected to have symptoms of COVID 19 or flu:     No visitors allowed for adult patients.   Children (under age 18) can have 1 named visitor.     People who are sick or showing symptoms of COVID 19 or flu:    Are not allowed to visit patients--we can only make exceptions in special situations.       Please follow these guidelines for your visit:          Please maintain social distance          Masking is optional--however at times you may be asked to wear a mask for the safety of yourself and others     Clean your hands with alcohol hand . Do this when you arrive at and leave the building and patient room,    And again after you touch your mask or anything in the room.     Go directly to and from the room you are visiting.     Stay in the patient s room during your visit. Limit going to other places in the hospital as much as possible     Leave bags and jackets at home or in the car.     For everyone s health, please don t come and go during your visit. That includes for smoking   during your visit.

## 2024-08-13 ENCOUNTER — VIRTUAL VISIT (OUTPATIENT)
Dept: SURGERY | Facility: CLINIC | Age: 74
End: 2024-08-13
Attending: THORACIC SURGERY (CARDIOTHORACIC VASCULAR SURGERY)
Payer: MEDICARE

## 2024-08-13 DIAGNOSIS — K44.9 HIATAL HERNIA: Primary | ICD-10-CM

## 2024-08-13 PROCEDURE — 99212 OFFICE O/P EST SF 10 MIN: CPT | Mod: 95 | Performed by: THORACIC SURGERY (CARDIOTHORACIC VASCULAR SURGERY)

## 2024-08-13 NOTE — PROGRESS NOTES
Virtual Visit Details    Type of service:  Video Visit   Video Start Time: 5:38 PM  Video End Time:5:43 PM    Originating Location (pt. Location): Home    Distant Location (provider location):  On-site  Platform used for Video Visit: Jose    Dear Dr. Blake,     I saw Flaco Hickman in follow up for the evaluation and treatment of a hiatal hernia.      HPI  From 4/23/2024:  Flaco Hickman is a 74 year old male with dysphagia to solids and intermittently with liquids and pills. He takes about 1/2 hour to eat. He regurgitates solids and liquids. This has been going on for about one to two years.  He started with heartburn in his early 30's. His weight is stable. He is on omeprazole once daily and has no heartburn or nocturnal symptoms.     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 He was placed on medication.    Today, 8/13/2024, his weight remains stable and his symptoms persist.      Previsit Tests   Esophagram 7/1/2024: Moderate hiatal hernia      Manometry 5/2/2024: Esophagogastric junction outlet obstruction due to hiatal hernia. Catheter likely did not pass gastroesophageal junction    CT chest (04/16/2024): Moderate to large hiatal hernia. Focal bony expansion of the lateral 6th and 7th ribs, without evidence of cortical destruction thought to be benign .        Esophagram (01/08/2024): Moderate-sized hiatal hernia. Moderate esophageal dysmotility with tertiary contractions, proximal escape and stasis.      EGD 11/20/2023: Hiatal hernia, GE flap valve Hill grade 4, chronic gastritis, GE junction changes consistent with reflux, candida     PMH  Reviewed, as below     Past Medical History:   Diagnosis Date    Allergies     Dependence on nocturnal oxygen therapy     Depression     Hiatal hernia with gastroesophageal reflux     Hypercholesteremia     Hypothyroidism     Idiopathic angio-edema-urticaria     Migraine     Nerve pain      right hand    SNHL (sensorineural hearing loss)     Stopped smoking with 50 pack year history       Right shoulder melanoma     PSH  Reviewed, as below     Past Surgical History:   Procedure Laterality Date    BLEPHAROPLASTY, BROW LIFT, COMBINED Bilateral 11/15/2021    Procedure: Both upper eyelid blepharoplasty and direct browplasty;  Surgeon: Desirae Sanchez MD;  Location: [facility] OR    CHEILECTOMY      Toe    DENTAL SURGERY      L5 laminectomy      right total knee Right        Soft tissue excision (03/2024)    Current Outpatient Medications   Medication Sig Dispense Refill    acetaminophen (TYLENOL) 325 MG tablet Take 325-650 mg by mouth      ascorbic acid (VITAMIN C) 250 MG CHEW chewable tablet Take 250 mg by mouth every morning      atorvastatin (LIPITOR) 80 MG tablet Take 80 mg by mouth every evening      buPROPion (ZYBAN) 150 MG 12 hr tablet Take 150 mg by mouth every morning      busPIRone (BUSPAR) 15 MG tablet Take 15 mg by mouth every morning      cetirizine (ZYRTEC) 10 MG tablet Take 10 mg by mouth every morning      cholecalciferol (VITAMIN D3) 25 mcg (1000 units) capsule Take 1 capsule by mouth every morning      escitalopram (LEXAPRO) 5 MG tablet Take 7.5 mg by mouth every morning      ferrous sulfate (FEROSUL) 325 (65 Fe) MG tablet Take 325 mg by mouth daily (with breakfast)      gabapentin (NEURONTIN) 300 MG capsule Take 600 mg by mouth at bedtime      hypromellose-dextran (ARTIFICAL TEARS) 0.1-0.3 % ophthalmic solution Apply 1 drop to eye      levothyroxine (SYNTHROID/LEVOTHROID) 75 MCG tablet Take 75 mcg by mouth every morning      loratadine (CLARITIN) 10 MG tablet Take 10 mg by mouth every morning      memantine (NAMENDA) 5 MG tablet Take 5 mg by mouth every morning      omeprazole (PRILOSEC) 20 MG DR capsule Take 20 mg by mouth every morning      predniSONE (DELTASONE) 20 MG tablet Take 20 mg by mouth (Patient not taking: Reported on 8/12/2024)      vitamin B complex with vitamin C (VITAMIN  B  COMPLEX) tablet Take 1 tablet by mouth every morning       No current facility-administered medications for this visit.         ETOH:  None  TOB:  ages 10 - 64, 1 pack per day. Quit 10 years ago        Physical examination  Physical Exam  Constitutional:       Appearance: Normal appearance. He is normal weight.   Eyes:      Conjunctiva/sclera: Conjunctivae normal.   Cardiovascular:      Rate and Rhythm: Normal rate.   Pulmonary:      Effort: Pulmonary effort is normal.   Abdominal:      General: Abdomen is flat.   Skin:     General: Skin is warm and dry.   Neurological:      Mental Status: He is alert and oriented to person, place, and time.   Psychiatric:         Mood and Affect: Mood normal.         Behavior: Behavior normal.         Thought Content: Thought content normal.         Judgment: Judgment normal.            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 He is here with his wife. They have two children and six grandchildren.         Code Status: Full Code     IMPRESSION (K44.9,  K21.9) Hiatal hernia with gastroesophageal reflux      This person is a 74 year old male with a symptomatic large hiatal hernia. He is a good surgical candidate. I think his symptoms are attributable to the hernia and not nutcracker esophagus.      PLAN  I spent 15 min on the date of the encounter in chart review, patient visit, review of tests, documentation and/or discussion with other providers about the issues documented above. I reviewed the plan as follows:     Procedure planned: Robotic-assisted laparoscopic hiatal hernia repair, possible gastroplasty, possible gastrostomy, fundoplication     The risks include, but are not limited to the following.  There is a risk of injury to the stomach, esophagus or abdominal contents.  There is a risk of injury to the vagus nerves, which could result in functional emptying issues for the stomach.  There is a risk of problems with the wrap, which can  "include difficulty swallowing, persistent reflux and pain.  Coughing or retching/vomiting in the weeks after surgery can result in breakdown of the repair.  If the esophagus does not reach the abdominal cavity, a \"new\" esophagus will be made out of the top of the stomach.  This gastroplasty can dilate over time, causing new swallowing difficulties, and continues to produce acid.  The wrap may loosen over time and need to be revised with a new operation.  There is a risk of postoperative swelling from air under the skin, causing your face and chest to appear swollen. This is not dangerous and will reabsorb over time. The lung cavities may be entered and tubes placed to evacuate air and fluid.   Finally, there is a risk of death.       Surgery is scheduled for 8/16/2024.     Necessary Preop Tests & Appointments: None     Regional Anesthesia Plan: None     Anticoagulation Plan: Prophylactic Lovenox        I appreciate the opportunity to participate in the care of your patient and will keep you updated.        Sincerely,     Darren Davidson MD   "

## 2024-08-13 NOTE — LETTER
8/13/2024      Flaco Hickman  [address]  [place] [address]      Dear Colleague,    Thank you for referring your patient, Flaco Hickman, to the [facility]. Please see a copy of my visit note below.    Virtual Visit Details    Type of service:  Video Visit   Video Start Time: 5:38 PM  Video End Time:5:43 PM    Originating Location (pt. Location): Home    Distant Location (provider location):  On-site  Platform used for Video Visit: Jose    Dear Dr. Blake,     I saw Flaco Hickman in follow up for the evaluation and treatment of a hiatal hernia.      HPI  From 4/23/2024:  Flaco Hickman is a 74 year old male with dysphagia to solids and intermittently with liquids and pills. He takes about 1/2 hour to eat. He regurgitates solids and liquids. This has been going on for about one to two years.  He started with heartburn in his early 30's. His weight is stable. He is on omeprazole once daily and has no heartburn or nocturnal symptoms.     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 He was placed on medication.    Today, 8/13/2024, his weight remains stable and his symptoms persist.      Previsit Tests   Esophagram 7/1/2024: Moderate hiatal hernia      Manometry 5/2/2024: Esophagogastric junction outlet obstruction due to hiatal hernia. Catheter likely did not pass gastroesophageal junction    CT chest (04/16/2024): Moderate to large hiatal hernia. Focal bony expansion of the lateral 6th and 7th ribs, without evidence of cortical destruction thought to be benign .        Esophagram (01/08/2024): Moderate-sized hiatal hernia. Moderate esophageal dysmotility with tertiary contractions, proximal escape and stasis.      EGD 11/20/2023: Hiatal hernia, GE flap valve Hill grade 4, chronic gastritis, GE junction changes consistent with reflux, candida     PMH  Reviewed, as below     Past Medical  History:   Diagnosis Date     Allergies      Dependence on nocturnal oxygen therapy      Depression      Hiatal hernia with gastroesophageal reflux      Hypercholesteremia      Hypothyroidism      Idiopathic angio-edema-urticaria      Migraine      Nerve pain     right hand     SNHL (sensorineural hearing loss)      Stopped smoking with 50 pack year history       Right shoulder melanoma     PSH  Reviewed, as below     Past Surgical History:   Procedure Laterality Date     BLEPHAROPLASTY, BROW LIFT, COMBINED Bilateral 11/15/2021    Procedure: Both upper eyelid blepharoplasty and direct browplasty;  Surgeon: Desirae Sanchez MD;  Location: [facility]     CHEILECTOMY      Toe     DENTAL SURGERY       L5 laminectomy       right total knee Right        Soft tissue excision (03/2024)    Current Outpatient Medications   Medication Sig Dispense Refill     acetaminophen (TYLENOL) 325 MG tablet Take 325-650 mg by mouth       ascorbic acid (VITAMIN C) 250 MG CHEW chewable tablet Take 250 mg by mouth every morning       atorvastatin (LIPITOR) 80 MG tablet Take 80 mg by mouth every evening       buPROPion (ZYBAN) 150 MG 12 hr tablet Take 150 mg by mouth every morning       busPIRone (BUSPAR) 15 MG tablet Take 15 mg by mouth every morning       cetirizine (ZYRTEC) 10 MG tablet Take 10 mg by mouth every morning       cholecalciferol (VITAMIN D3) 25 mcg (1000 units) capsule Take 1 capsule by mouth every morning       escitalopram (LEXAPRO) 5 MG tablet Take 7.5 mg by mouth every morning       ferrous sulfate (FEROSUL) 325 (65 Fe) MG tablet Take 325 mg by mouth daily (with breakfast)       gabapentin (NEURONTIN) 300 MG capsule Take 600 mg by mouth at bedtime       hypromellose-dextran (ARTIFICAL TEARS) 0.1-0.3 % ophthalmic solution Apply 1 drop to eye       levothyroxine (SYNTHROID/LEVOTHROID) 75 MCG tablet Take 75 mcg by mouth every morning       loratadine (CLARITIN) 10 MG tablet Take 10 mg by mouth every morning       memantine  (NAMENDA) 5 MG tablet Take 5 mg by mouth every morning       omeprazole (PRILOSEC) 20 MG DR capsule Take 20 mg by mouth every morning       predniSONE (DELTASONE) 20 MG tablet Take 20 mg by mouth (Patient not taking: Reported on 8/12/2024)       vitamin B complex with vitamin C (VITAMIN  B COMPLEX) tablet Take 1 tablet by mouth every morning       No current facility-administered medications for this visit.         ETOH:  None  TOB:  ages 10 - 64, 1 pack per day. Quit 10 years ago        Physical examination  Physical Exam  Constitutional:       Appearance: Normal appearance. He is normal weight.   Eyes:      Conjunctiva/sclera: Conjunctivae normal.   Cardiovascular:      Rate and Rhythm: Normal rate.   Pulmonary:      Effort: Pulmonary effort is normal.   Abdominal:      General: Abdomen is flat.   Skin:     General: Skin is warm and dry.   Neurological:      Mental Status: He is alert and oriented to person, place, and time.   Psychiatric:         Mood and Affect: Mood normal.         Behavior: Behavior normal.         Thought Content: Thought content normal.         Judgment: Judgment normal.            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 They have two children and six grandchildren.         Code Status: Full Code     IMPRESSION (K44.9,  K21.9) Hiatal hernia with gastroesophageal reflux      This person is a 74 year old male with a symptomatic large hiatal hernia. He is a good surgical candidate. I think his symptoms are attributable to the hernia and not nutcracker esophagus.      PLAN  I spent 15 min on the date of the encounter in chart review, patient visit, review of tests, documentation and/or discussion with other providers about the issues documented above. I reviewed the plan as follows:     Procedure planned: Robotic-assisted laparoscopic hiatal hernia repair, possible gastroplasty, possible gastrostomy, fundoplication     The risks  "include, but are not limited to the following.  There is a risk of injury to the stomach, esophagus or abdominal contents.  There is a risk of injury to the vagus nerves, which could result in functional emptying issues for the stomach.  There is a risk of problems with the wrap, which can include difficulty swallowing, persistent reflux and pain.  Coughing or retching/vomiting in the weeks after surgery can result in breakdown of the repair.  If the esophagus does not reach the abdominal cavity, a \"new\" esophagus will be made out of the top of the stomach.  This gastroplasty can dilate over time, causing new swallowing difficulties, and continues to produce acid.  The wrap may loosen over time and need to be revised with a new operation.  There is a risk of postoperative swelling from air under the skin, causing your face and chest to appear swollen. This is not dangerous and will reabsorb over time. The lung cavities may be entered and tubes placed to evacuate air and fluid.   Finally, there is a risk of death.       Surgery is scheduled for 8/16/2024.     Necessary Preop Tests & Appointments: None     Regional Anesthesia Plan: None     Anticoagulation Plan: Prophylactic Lovenox        I appreciate the opportunity to participate in the care of your patient and will keep you updated.        Sincerely,     Darren Davidson MD       Again, thank you for allowing me to participate in the care of your patient.        Sincerely,        Darren Davidson MD  "

## 2024-08-13 NOTE — NURSING NOTE
Current patient location: [address] [place], [place] [address]  [facility] [address]    Is the patient currently in the state of [place]? YES    Visit mode:VIDEO    If the visit is dropped, the patient can be reconnected by: VIDEO VISIT: Send to e-mail at: [email or web address]    Will anyone else be joining the visit? NO  (If patient encounters technical issues they should call [phone]    How would you like to obtain your AVS? MyChart    Are changes needed to the allergy or medication list? Pt stated no changes to allergies and Pt stated no med changes    Are refills needed on medications prescribed by this physician? NO    Rooming Documentation:  Assigned questionnaire(s) completed      Reason for visit: RECHECK    Shiela PORTILLO

## 2024-08-13 NOTE — TELEPHONE ENCOUNTER
Pt has been reached via phone and [employer] in subsequent messages.  Understanding verbalized.  Below is the provider's message to pt.          Cristobal,     [facility] I was able to read through the documentation and I do not believe there was anything further that was needed from my standpoint. Please let me know if there is anything I can do to help.        Please call my nurse Lauryn ([phone]), Marie ([phone]) with any questions or concerns.     Sincerely,     Macey Hui PA-C  Division of Gastroenterology

## 2024-08-16 ENCOUNTER — HOSPITAL ENCOUNTER (INPATIENT)
Facility: CLINIC | Age: 74
LOS: 3 days | Discharge: HOME | DRG: 328 | End: 2024-08-19
Attending: THORACIC SURGERY (CARDIOTHORACIC VASCULAR SURGERY) | Admitting: THORACIC SURGERY (CARDIOTHORACIC VASCULAR SURGERY)
Payer: COMMERCIAL

## 2024-08-16 ENCOUNTER — APPOINTMENT (OUTPATIENT)
Dept: GENERAL RADIOLOGY | Facility: CLINIC | Age: 74
DRG: 328 | End: 2024-08-16
Attending: PHYSICIAN ASSISTANT
Payer: COMMERCIAL

## 2024-08-16 ENCOUNTER — ANESTHESIA (OUTPATIENT)
Dept: SURGERY | Facility: CLINIC | Age: 74
DRG: 328 | End: 2024-08-16
Payer: COMMERCIAL

## 2024-08-16 DIAGNOSIS — K21.00 GASTROESOPHAGEAL REFLUX DISEASE WITH ESOPHAGITIS, UNSPECIFIED WHETHER HEMORRHAGE: Primary | ICD-10-CM

## 2024-08-16 LAB
ABO/RH(D): NORMAL
ANION GAP SERPL CALCULATED.3IONS-SCNC: 10 MMOL/L (ref 7–15)
ANTIBODY SCREEN: NEGATIVE
BUN SERPL-MCNC: 16 MG/DL (ref 8–23)
CALCIUM SERPL-MCNC: 9.2 MG/DL (ref 8.8–10.4)
CHLORIDE SERPL-SCNC: 106 MMOL/L (ref 98–107)
CREAT SERPL-MCNC: 1.16 MG/DL (ref 0.67–1.17)
CREAT SERPL-MCNC: 1.23 MG/DL (ref 0.67–1.17)
EGFRCR SERPLBLD CKD-EPI 2021: 62 ML/MIN/1.73M2
EGFRCR SERPLBLD CKD-EPI 2021: 66 ML/MIN/1.73M2
GLUCOSE BLDC GLUCOMTR-MCNC: 89 MG/DL (ref 70–99)
GLUCOSE SERPL-MCNC: 95 MG/DL (ref 70–99)
HCO3 SERPL-SCNC: 24 MMOL/L (ref 22–29)
POTASSIUM SERPL-SCNC: 4.5 MMOL/L (ref 3.4–5.3)
SODIUM SERPL-SCNC: 140 MMOL/L (ref 135–145)
SPECIMEN EXPIRATION DATE: NORMAL

## 2024-08-16 PROCEDURE — 120N000002 HC R&B MED SURG/OB UMMC

## 2024-08-16 PROCEDURE — 999N000141 HC STATISTIC PRE-PROCEDURE NURSING ASSESSMENT: Performed by: THORACIC SURGERY (CARDIOTHORACIC VASCULAR SURGERY)

## 2024-08-16 PROCEDURE — 0DQ64ZZ REPAIR STOMACH, PERCUTANEOUS ENDOSCOPIC APPROACH: ICD-10-PCS | Performed by: THORACIC SURGERY (CARDIOTHORACIC VASCULAR SURGERY)

## 2024-08-16 PROCEDURE — 86900 BLOOD TYPING SEROLOGIC ABO: CPT | Performed by: PHYSICIAN ASSISTANT

## 2024-08-16 PROCEDURE — 258N000003 HC RX IP 258 OP 636: Performed by: NURSE ANESTHETIST, CERTIFIED REGISTERED

## 2024-08-16 PROCEDURE — 80048 BASIC METABOLIC PNL TOTAL CA: CPT | Performed by: PHYSICIAN ASSISTANT

## 2024-08-16 PROCEDURE — 43281 LAP PARAESOPHAG HERN REPAIR: CPT | Performed by: NURSE ANESTHETIST, CERTIFIED REGISTERED

## 2024-08-16 PROCEDURE — 258N000003 HC RX IP 258 OP 636: Performed by: PHYSICIAN ASSISTANT

## 2024-08-16 PROCEDURE — 88305 TISSUE EXAM BY PATHOLOGIST: CPT | Mod: 26 | Performed by: STUDENT IN AN ORGANIZED HEALTH CARE EDUCATION/TRAINING PROGRAM

## 2024-08-16 PROCEDURE — 0W9940Z DRAINAGE OF RIGHT PLEURAL CAVITY WITH DRAINAGE DEVICE, PERCUTANEOUS ENDOSCOPIC APPROACH: ICD-10-PCS | Performed by: THORACIC SURGERY (CARDIOTHORACIC VASCULAR SURGERY)

## 2024-08-16 PROCEDURE — 250N000011 HC RX IP 250 OP 636: Performed by: ANESTHESIOLOGY

## 2024-08-16 PROCEDURE — 250N000011 HC RX IP 250 OP 636: Performed by: NURSE ANESTHETIST, CERTIFIED REGISTERED

## 2024-08-16 PROCEDURE — 250N000011 HC RX IP 250 OP 636: Performed by: PHYSICIAN ASSISTANT

## 2024-08-16 PROCEDURE — 250N000009 HC RX 250: Performed by: NURSE ANESTHETIST, CERTIFIED REGISTERED

## 2024-08-16 PROCEDURE — 88302 TISSUE EXAM BY PATHOLOGIST: CPT | Mod: TC | Performed by: THORACIC SURGERY (CARDIOTHORACIC VASCULAR SURGERY)

## 2024-08-16 PROCEDURE — 250N000009 HC RX 250: Performed by: STUDENT IN AN ORGANIZED HEALTH CARE EDUCATION/TRAINING PROGRAM

## 2024-08-16 PROCEDURE — 250N000011 HC RX IP 250 OP 636: Performed by: STUDENT IN AN ORGANIZED HEALTH CARE EDUCATION/TRAINING PROGRAM

## 2024-08-16 PROCEDURE — 0BQT4ZZ REPAIR DIAPHRAGM, PERCUTANEOUS ENDOSCOPIC APPROACH: ICD-10-PCS | Performed by: THORACIC SURGERY (CARDIOTHORACIC VASCULAR SURGERY)

## 2024-08-16 PROCEDURE — 250N000009 HC RX 250: Performed by: THORACIC SURGERY (CARDIOTHORACIC VASCULAR SURGERY)

## 2024-08-16 PROCEDURE — 43281 LAP PARAESOPHAG HERN REPAIR: CPT | Mod: GC | Performed by: THORACIC SURGERY (CARDIOTHORACIC VASCULAR SURGERY)

## 2024-08-16 PROCEDURE — 82565 ASSAY OF CREATININE: CPT | Performed by: PHYSICIAN ASSISTANT

## 2024-08-16 PROCEDURE — 272N000001 HC OR GENERAL SUPPLY STERILE: Performed by: THORACIC SURGERY (CARDIOTHORACIC VASCULAR SURGERY)

## 2024-08-16 PROCEDURE — 36415 COLL VENOUS BLD VENIPUNCTURE: CPT | Performed by: PHYSICIAN ASSISTANT

## 2024-08-16 PROCEDURE — 43281 LAP PARAESOPHAG HERN REPAIR: CPT | Performed by: STUDENT IN AN ORGANIZED HEALTH CARE EDUCATION/TRAINING PROGRAM

## 2024-08-16 PROCEDURE — 250N000011 HC RX IP 250 OP 636: Performed by: THORACIC SURGERY (CARDIOTHORACIC VASCULAR SURGERY)

## 2024-08-16 PROCEDURE — 999N000065 XR CHEST PORT 1 VIEW

## 2024-08-16 PROCEDURE — 370N000017 HC ANESTHESIA TECHNICAL FEE, PER MIN: Performed by: THORACIC SURGERY (CARDIOTHORACIC VASCULAR SURGERY)

## 2024-08-16 PROCEDURE — 99100 ANES PT EXTEME AGE<1 YR&>70: CPT | Performed by: NURSE ANESTHETIST, CERTIFIED REGISTERED

## 2024-08-16 PROCEDURE — 360N000080 HC SURGERY LEVEL 7, PER MIN: Performed by: THORACIC SURGERY (CARDIOTHORACIC VASCULAR SURGERY)

## 2024-08-16 PROCEDURE — 710N000010 HC RECOVERY PHASE 1, LEVEL 2, PER MIN: Performed by: THORACIC SURGERY (CARDIOTHORACIC VASCULAR SURGERY)

## 2024-08-16 PROCEDURE — 250N000025 HC SEVOFLURANE, PER MIN: Performed by: THORACIC SURGERY (CARDIOTHORACIC VASCULAR SURGERY)

## 2024-08-16 PROCEDURE — 8E0W4CZ ROBOTIC ASSISTED PROCEDURE OF TRUNK REGION, PERCUTANEOUS ENDOSCOPIC APPROACH: ICD-10-PCS | Performed by: THORACIC SURGERY (CARDIOTHORACIC VASCULAR SURGERY)

## 2024-08-16 PROCEDURE — 99100 ANES PT EXTEME AGE<1 YR&>70: CPT | Performed by: STUDENT IN AN ORGANIZED HEALTH CARE EDUCATION/TRAINING PROGRAM

## 2024-08-16 PROCEDURE — 88302 TISSUE EXAM BY PATHOLOGIST: CPT | Mod: 26 | Performed by: STUDENT IN AN ORGANIZED HEALTH CARE EDUCATION/TRAINING PROGRAM

## 2024-08-16 PROCEDURE — 71045 X-RAY EXAM CHEST 1 VIEW: CPT | Mod: 26 | Performed by: STUDENT IN AN ORGANIZED HEALTH CARE EDUCATION/TRAINING PROGRAM

## 2024-08-16 RX ORDER — HYDROMORPHONE HCL IN WATER/PF 6 MG/30 ML
0.4 PATIENT CONTROLLED ANALGESIA SYRINGE INTRAVENOUS EVERY 5 MIN PRN
Status: DISCONTINUED | OUTPATIENT
Start: 2024-08-16 | End: 2024-08-16 | Stop reason: HOSPADM

## 2024-08-16 RX ORDER — LIDOCAINE HYDROCHLORIDE 20 MG/ML
INJECTION, SOLUTION INFILTRATION; PERINEURAL PRN
Status: DISCONTINUED | OUTPATIENT
Start: 2024-08-16 | End: 2024-08-16

## 2024-08-16 RX ORDER — DEXAMETHASONE SODIUM PHOSPHATE 4 MG/ML
INJECTION, SOLUTION INTRA-ARTICULAR; INTRALESIONAL; INTRAMUSCULAR; INTRAVENOUS; SOFT TISSUE PRN
Status: DISCONTINUED | OUTPATIENT
Start: 2024-08-16 | End: 2024-08-16

## 2024-08-16 RX ORDER — ONDANSETRON 4 MG/1
4 TABLET, ORALLY DISINTEGRATING ORAL EVERY 30 MIN PRN
Status: DISCONTINUED | OUTPATIENT
Start: 2024-08-16 | End: 2024-08-16 | Stop reason: HOSPADM

## 2024-08-16 RX ORDER — ONDANSETRON 2 MG/ML
INJECTION INTRAMUSCULAR; INTRAVENOUS PRN
Status: DISCONTINUED | OUTPATIENT
Start: 2024-08-16 | End: 2024-08-16

## 2024-08-16 RX ORDER — NALOXONE HYDROCHLORIDE 0.4 MG/ML
0.2 INJECTION, SOLUTION INTRAMUSCULAR; INTRAVENOUS; SUBCUTANEOUS
Status: DISCONTINUED | OUTPATIENT
Start: 2024-08-16 | End: 2024-08-19 | Stop reason: HOSPADM

## 2024-08-16 RX ORDER — METHOCARBAMOL 500 MG/1
500 TABLET, FILM COATED ORAL EVERY 6 HOURS PRN
Status: DISCONTINUED | OUTPATIENT
Start: 2024-08-16 | End: 2024-08-19 | Stop reason: HOSPADM

## 2024-08-16 RX ORDER — BUPIVACAINE HYDROCHLORIDE AND EPINEPHRINE 2.5; 5 MG/ML; UG/ML
INJECTION, SOLUTION INFILTRATION; PERINEURAL PRN
Status: DISCONTINUED | OUTPATIENT
Start: 2024-08-16 | End: 2024-08-16 | Stop reason: HOSPADM

## 2024-08-16 RX ORDER — FENTANYL CITRATE 50 UG/ML
50 INJECTION, SOLUTION INTRAMUSCULAR; INTRAVENOUS EVERY 5 MIN PRN
Status: DISCONTINUED | OUTPATIENT
Start: 2024-08-16 | End: 2024-08-16 | Stop reason: HOSPADM

## 2024-08-16 RX ORDER — HYDROMORPHONE HCL IN WATER/PF 6 MG/30 ML
0.2 PATIENT CONTROLLED ANALGESIA SYRINGE INTRAVENOUS EVERY 5 MIN PRN
Status: DISCONTINUED | OUTPATIENT
Start: 2024-08-16 | End: 2024-08-16 | Stop reason: HOSPADM

## 2024-08-16 RX ORDER — LIDOCAINE 4 G/G
1 PATCH TOPICAL
Status: DISCONTINUED | OUTPATIENT
Start: 2024-08-17 | End: 2024-08-19 | Stop reason: HOSPADM

## 2024-08-16 RX ORDER — METOPROLOL TARTRATE 1 MG/ML
1-2 INJECTION, SOLUTION INTRAVENOUS EVERY 5 MIN PRN
Status: DISCONTINUED | OUTPATIENT
Start: 2024-08-16 | End: 2024-08-16 | Stop reason: HOSPADM

## 2024-08-16 RX ORDER — OXYCODONE HYDROCHLORIDE 5 MG/1
5 TABLET ORAL EVERY 4 HOURS PRN
Status: DISCONTINUED | OUTPATIENT
Start: 2024-08-16 | End: 2024-08-19 | Stop reason: HOSPADM

## 2024-08-16 RX ORDER — HYDROMORPHONE HCL IN WATER/PF 6 MG/30 ML
0.2 PATIENT CONTROLLED ANALGESIA SYRINGE INTRAVENOUS
Status: DISCONTINUED | OUTPATIENT
Start: 2024-08-16 | End: 2024-08-19 | Stop reason: HOSPADM

## 2024-08-16 RX ORDER — MEMANTINE HYDROCHLORIDE 5 MG/1
5 TABLET ORAL EVERY MORNING
Status: DISCONTINUED | OUTPATIENT
Start: 2024-08-17 | End: 2024-08-19 | Stop reason: HOSPADM

## 2024-08-16 RX ORDER — ALBUTEROL SULFATE 90 UG/1
2 AEROSOL, METERED RESPIRATORY (INHALATION) 4 TIMES DAILY
COMMUNITY
Start: 2024-06-06

## 2024-08-16 RX ORDER — PANTOPRAZOLE SODIUM 40 MG/1
40 TABLET, DELAYED RELEASE ORAL
Status: DISCONTINUED | OUTPATIENT
Start: 2024-08-17 | End: 2024-08-19 | Stop reason: HOSPADM

## 2024-08-16 RX ORDER — FENTANYL CITRATE 50 UG/ML
INJECTION, SOLUTION INTRAMUSCULAR; INTRAVENOUS PRN
Status: DISCONTINUED | OUTPATIENT
Start: 2024-08-16 | End: 2024-08-16

## 2024-08-16 RX ORDER — HYDROMORPHONE HCL IN WATER/PF 6 MG/30 ML
0.4 PATIENT CONTROLLED ANALGESIA SYRINGE INTRAVENOUS
Status: DISCONTINUED | OUTPATIENT
Start: 2024-08-16 | End: 2024-08-19 | Stop reason: HOSPADM

## 2024-08-16 RX ORDER — NALOXONE HYDROCHLORIDE 0.4 MG/ML
0.4 INJECTION, SOLUTION INTRAMUSCULAR; INTRAVENOUS; SUBCUTANEOUS
Status: DISCONTINUED | OUTPATIENT
Start: 2024-08-16 | End: 2024-08-19 | Stop reason: HOSPADM

## 2024-08-16 RX ORDER — ENOXAPARIN SODIUM 100 MG/ML
40 INJECTION SUBCUTANEOUS EVERY 24 HOURS
Status: DISCONTINUED | OUTPATIENT
Start: 2024-08-17 | End: 2024-08-19 | Stop reason: HOSPADM

## 2024-08-16 RX ORDER — EPHEDRINE SULFATE 50 MG/ML
INJECTION, SOLUTION INTRAMUSCULAR; INTRAVENOUS; SUBCUTANEOUS PRN
Status: DISCONTINUED | OUTPATIENT
Start: 2024-08-16 | End: 2024-08-16

## 2024-08-16 RX ORDER — SODIUM CHLORIDE, SODIUM LACTATE, POTASSIUM CHLORIDE, CALCIUM CHLORIDE 600; 310; 30; 20 MG/100ML; MG/100ML; MG/100ML; MG/100ML
INJECTION, SOLUTION INTRAVENOUS CONTINUOUS PRN
Status: DISCONTINUED | OUTPATIENT
Start: 2024-08-16 | End: 2024-08-16

## 2024-08-16 RX ORDER — GABAPENTIN 300 MG/1
600 CAPSULE ORAL AT BEDTIME
Status: DISCONTINUED | OUTPATIENT
Start: 2024-08-16 | End: 2024-08-17

## 2024-08-16 RX ORDER — FENTANYL CITRATE 50 UG/ML
25 INJECTION, SOLUTION INTRAMUSCULAR; INTRAVENOUS EVERY 5 MIN PRN
Status: DISCONTINUED | OUTPATIENT
Start: 2024-08-16 | End: 2024-08-16 | Stop reason: HOSPADM

## 2024-08-16 RX ORDER — GLYCOPYRROLATE 0.2 MG/ML
INJECTION, SOLUTION INTRAMUSCULAR; INTRAVENOUS PRN
Status: DISCONTINUED | OUTPATIENT
Start: 2024-08-16 | End: 2024-08-16

## 2024-08-16 RX ORDER — ACETAMINOPHEN 325 MG/1
975 TABLET ORAL EVERY 6 HOURS
Status: DISCONTINUED | OUTPATIENT
Start: 2024-08-16 | End: 2024-08-17

## 2024-08-16 RX ORDER — BUSPIRONE HYDROCHLORIDE 15 MG/1
15 TABLET ORAL EVERY MORNING
Status: DISCONTINUED | OUTPATIENT
Start: 2024-08-17 | End: 2024-08-19 | Stop reason: HOSPADM

## 2024-08-16 RX ORDER — POLYETHYLENE GLYCOL 3350 17 G/17G
17 POWDER, FOR SOLUTION ORAL DAILY
Status: DISCONTINUED | OUTPATIENT
Start: 2024-08-17 | End: 2024-08-19 | Stop reason: HOSPADM

## 2024-08-16 RX ORDER — CEFAZOLIN SODIUM/WATER 2 G/20 ML
2 SYRINGE (ML) INTRAVENOUS
Status: COMPLETED | OUTPATIENT
Start: 2024-08-16 | End: 2024-08-16

## 2024-08-16 RX ORDER — ONDANSETRON 2 MG/ML
4 INJECTION INTRAMUSCULAR; INTRAVENOUS EVERY 6 HOURS PRN
Status: DISCONTINUED | OUTPATIENT
Start: 2024-08-16 | End: 2024-08-19 | Stop reason: HOSPADM

## 2024-08-16 RX ORDER — HYDRALAZINE HYDROCHLORIDE 20 MG/ML
2.5-5 INJECTION INTRAMUSCULAR; INTRAVENOUS EVERY 10 MIN PRN
Status: DISCONTINUED | OUTPATIENT
Start: 2024-08-16 | End: 2024-08-16 | Stop reason: HOSPADM

## 2024-08-16 RX ORDER — ONDANSETRON 2 MG/ML
4 INJECTION INTRAMUSCULAR; INTRAVENOUS EVERY 30 MIN PRN
Status: DISCONTINUED | OUTPATIENT
Start: 2024-08-16 | End: 2024-08-16 | Stop reason: HOSPADM

## 2024-08-16 RX ORDER — SODIUM CHLORIDE, SODIUM LACTATE, POTASSIUM CHLORIDE, CALCIUM CHLORIDE 600; 310; 30; 20 MG/100ML; MG/100ML; MG/100ML; MG/100ML
INJECTION, SOLUTION INTRAVENOUS CONTINUOUS
Status: DISCONTINUED | OUTPATIENT
Start: 2024-08-16 | End: 2024-08-18

## 2024-08-16 RX ORDER — PROCHLORPERAZINE MALEATE 5 MG
5 TABLET ORAL EVERY 6 HOURS PRN
Status: DISCONTINUED | OUTPATIENT
Start: 2024-08-16 | End: 2024-08-19 | Stop reason: HOSPADM

## 2024-08-16 RX ORDER — PROPOFOL 10 MG/ML
INJECTION, EMULSION INTRAVENOUS PRN
Status: DISCONTINUED | OUTPATIENT
Start: 2024-08-16 | End: 2024-08-16

## 2024-08-16 RX ORDER — NALOXONE HYDROCHLORIDE 0.4 MG/ML
0.1 INJECTION, SOLUTION INTRAMUSCULAR; INTRAVENOUS; SUBCUTANEOUS
Status: DISCONTINUED | OUTPATIENT
Start: 2024-08-16 | End: 2024-08-16 | Stop reason: HOSPADM

## 2024-08-16 RX ORDER — DEXAMETHASONE SODIUM PHOSPHATE 4 MG/ML
4 INJECTION, SOLUTION INTRA-ARTICULAR; INTRALESIONAL; INTRAMUSCULAR; INTRAVENOUS; SOFT TISSUE
Status: DISCONTINUED | OUTPATIENT
Start: 2024-08-16 | End: 2024-08-16 | Stop reason: HOSPADM

## 2024-08-16 RX ORDER — OXYCODONE HYDROCHLORIDE 10 MG/1
10 TABLET ORAL EVERY 4 HOURS PRN
Status: DISCONTINUED | OUTPATIENT
Start: 2024-08-16 | End: 2024-08-19 | Stop reason: HOSPADM

## 2024-08-16 RX ORDER — SODIUM CHLORIDE, SODIUM LACTATE, POTASSIUM CHLORIDE, CALCIUM CHLORIDE 600; 310; 30; 20 MG/100ML; MG/100ML; MG/100ML; MG/100ML
INJECTION, SOLUTION INTRAVENOUS CONTINUOUS
Status: DISCONTINUED | OUTPATIENT
Start: 2024-08-16 | End: 2024-08-16 | Stop reason: HOSPADM

## 2024-08-16 RX ORDER — ENOXAPARIN SODIUM 100 MG/ML
40 INJECTION SUBCUTANEOUS
Status: COMPLETED | OUTPATIENT
Start: 2024-08-16 | End: 2024-08-16

## 2024-08-16 RX ORDER — BUPROPION HYDROCHLORIDE 150 MG/1
150 TABLET ORAL EVERY MORNING
Status: DISCONTINUED | OUTPATIENT
Start: 2024-08-17 | End: 2024-08-19 | Stop reason: HOSPADM

## 2024-08-16 RX ORDER — ONDANSETRON 4 MG/1
4 TABLET, ORALLY DISINTEGRATING ORAL EVERY 6 HOURS PRN
Status: DISCONTINUED | OUTPATIENT
Start: 2024-08-16 | End: 2024-08-19 | Stop reason: HOSPADM

## 2024-08-16 RX ORDER — AMOXICILLIN 250 MG
1 CAPSULE ORAL 2 TIMES DAILY
Status: DISCONTINUED | OUTPATIENT
Start: 2024-08-16 | End: 2024-08-19 | Stop reason: HOSPADM

## 2024-08-16 RX ORDER — CEFAZOLIN SODIUM/WATER 2 G/20 ML
2 SYRINGE (ML) INTRAVENOUS SEE ADMIN INSTRUCTIONS
Status: DISCONTINUED | OUTPATIENT
Start: 2024-08-16 | End: 2024-08-16 | Stop reason: HOSPADM

## 2024-08-16 RX ORDER — LEVOTHYROXINE SODIUM 75 UG/1
75 TABLET ORAL
Status: DISCONTINUED | OUTPATIENT
Start: 2024-08-17 | End: 2024-08-19 | Stop reason: HOSPADM

## 2024-08-16 RX ADMIN — PHENYLEPHRINE HYDROCHLORIDE 50 MCG: 10 INJECTION INTRAVENOUS at 14:50

## 2024-08-16 RX ADMIN — Medication 10 MG: at 16:12

## 2024-08-16 RX ADMIN — SODIUM CHLORIDE, POTASSIUM CHLORIDE, SODIUM LACTATE AND CALCIUM CHLORIDE: 600; 310; 30; 20 INJECTION, SOLUTION INTRAVENOUS at 14:02

## 2024-08-16 RX ADMIN — EPHEDRINE SULFATE 10 MG: 5 INJECTION INTRAVENOUS at 13:03

## 2024-08-16 RX ADMIN — FENTANYL CITRATE 50 MCG: 50 INJECTION INTRAMUSCULAR; INTRAVENOUS at 16:54

## 2024-08-16 RX ADMIN — SUGAMMADEX 50 MG: 100 INJECTION, SOLUTION INTRAVENOUS at 17:21

## 2024-08-16 RX ADMIN — ENOXAPARIN SODIUM 40 MG: 40 INJECTION SUBCUTANEOUS at 12:04

## 2024-08-16 RX ADMIN — EPHEDRINE SULFATE 5 MG: 5 INJECTION INTRAVENOUS at 13:16

## 2024-08-16 RX ADMIN — PHENYLEPHRINE HYDROCHLORIDE 150 MCG: 10 INJECTION INTRAVENOUS at 14:06

## 2024-08-16 RX ADMIN — PHENYLEPHRINE HYDROCHLORIDE 100 MCG: 10 INJECTION INTRAVENOUS at 16:17

## 2024-08-16 RX ADMIN — FENTANYL CITRATE 50 MCG: 50 INJECTION, SOLUTION INTRAMUSCULAR; INTRAVENOUS at 18:06

## 2024-08-16 RX ADMIN — PHENYLEPHRINE HYDROCHLORIDE 200 MCG: 10 INJECTION INTRAVENOUS at 15:58

## 2024-08-16 RX ADMIN — PHENYLEPHRINE HYDROCHLORIDE 50 MCG: 10 INJECTION INTRAVENOUS at 14:25

## 2024-08-16 RX ADMIN — FENTANYL CITRATE 100 MCG: 50 INJECTION INTRAMUSCULAR; INTRAVENOUS at 12:26

## 2024-08-16 RX ADMIN — PHENYLEPHRINE HYDROCHLORIDE 150 MCG: 10 INJECTION INTRAVENOUS at 15:00

## 2024-08-16 RX ADMIN — SUGAMMADEX 50 MG: 100 INJECTION, SOLUTION INTRAVENOUS at 17:16

## 2024-08-16 RX ADMIN — Medication 2 G: at 16:34

## 2024-08-16 RX ADMIN — DEXMEDETOMIDINE HYDROCHLORIDE 8 MCG: 100 INJECTION, SOLUTION INTRAVENOUS at 17:31

## 2024-08-16 RX ADMIN — DEXMEDETOMIDINE HYDROCHLORIDE 8 MCG: 100 INJECTION, SOLUTION INTRAVENOUS at 16:54

## 2024-08-16 RX ADMIN — PHENYLEPHRINE HYDROCHLORIDE 150 MCG: 10 INJECTION INTRAVENOUS at 13:52

## 2024-08-16 RX ADMIN — SODIUM CHLORIDE, POTASSIUM CHLORIDE, SODIUM LACTATE AND CALCIUM CHLORIDE: 600; 310; 30; 20 INJECTION, SOLUTION INTRAVENOUS at 12:22

## 2024-08-16 RX ADMIN — FENTANYL CITRATE 50 MCG: 50 INJECTION INTRAMUSCULAR; INTRAVENOUS at 14:41

## 2024-08-16 RX ADMIN — HYDROMORPHONE HYDROCHLORIDE 0.5 MG: 1 INJECTION, SOLUTION INTRAMUSCULAR; INTRAVENOUS; SUBCUTANEOUS at 13:31

## 2024-08-16 RX ADMIN — ONDANSETRON 4 MG: 2 INJECTION INTRAMUSCULAR; INTRAVENOUS at 16:56

## 2024-08-16 RX ADMIN — PHENYLEPHRINE HYDROCHLORIDE 100 MCG: 10 INJECTION INTRAVENOUS at 16:15

## 2024-08-16 RX ADMIN — GLYCOPYRROLATE 0.2 MG: 0.2 INJECTION, SOLUTION INTRAMUSCULAR; INTRAVENOUS at 13:39

## 2024-08-16 RX ADMIN — PHENYLEPHRINE HYDROCHLORIDE 100 MCG: 10 INJECTION INTRAVENOUS at 15:28

## 2024-08-16 RX ADMIN — SODIUM CHLORIDE, POTASSIUM CHLORIDE, SODIUM LACTATE AND CALCIUM CHLORIDE: 600; 310; 30; 20 INJECTION, SOLUTION INTRAVENOUS at 21:10

## 2024-08-16 RX ADMIN — SUGAMMADEX 50 MG: 100 INJECTION, SOLUTION INTRAVENOUS at 17:12

## 2024-08-16 RX ADMIN — Medication 20 MG: at 14:53

## 2024-08-16 RX ADMIN — DEXMEDETOMIDINE HYDROCHLORIDE 8 MCG: 100 INJECTION, SOLUTION INTRAVENOUS at 15:40

## 2024-08-16 RX ADMIN — HYDROMORPHONE HYDROCHLORIDE 0.4 MG: 0.2 INJECTION, SOLUTION INTRAMUSCULAR; INTRAVENOUS; SUBCUTANEOUS at 19:06

## 2024-08-16 RX ADMIN — Medication 2 G: at 12:34

## 2024-08-16 RX ADMIN — FENTANYL CITRATE 50 MCG: 50 INJECTION, SOLUTION INTRAMUSCULAR; INTRAVENOUS at 17:59

## 2024-08-16 RX ADMIN — PHENYLEPHRINE HYDROCHLORIDE 50 MCG: 10 INJECTION INTRAVENOUS at 16:09

## 2024-08-16 RX ADMIN — DEXMEDETOMIDINE HYDROCHLORIDE 8 MCG: 100 INJECTION, SOLUTION INTRAVENOUS at 14:40

## 2024-08-16 RX ADMIN — DEXMEDETOMIDINE HYDROCHLORIDE 8 MCG: 100 INJECTION, SOLUTION INTRAVENOUS at 17:27

## 2024-08-16 RX ADMIN — PHENYLEPHRINE HYDROCHLORIDE 0.2 MCG/KG/MIN: 10 INJECTION INTRAVENOUS at 14:08

## 2024-08-16 RX ADMIN — PHENYLEPHRINE HYDROCHLORIDE 100 MCG: 10 INJECTION INTRAVENOUS at 14:53

## 2024-08-16 RX ADMIN — LIDOCAINE HYDROCHLORIDE 100 MG: 20 INJECTION, SOLUTION INFILTRATION; PERINEURAL at 12:41

## 2024-08-16 RX ADMIN — PROPOFOL 150 MG: 10 INJECTION, EMULSION INTRAVENOUS at 12:41

## 2024-08-16 RX ADMIN — Medication 80 MG: at 12:42

## 2024-08-16 RX ADMIN — PHENYLEPHRINE HYDROCHLORIDE 100 MCG: 10 INJECTION INTRAVENOUS at 13:37

## 2024-08-16 RX ADMIN — DEXAMETHASONE SODIUM PHOSPHATE 8 MG: 4 INJECTION, SOLUTION INTRA-ARTICULAR; INTRALESIONAL; INTRAMUSCULAR; INTRAVENOUS; SOFT TISSUE at 12:41

## 2024-08-16 RX ADMIN — SUGAMMADEX 50 MG: 100 INJECTION, SOLUTION INTRAVENOUS at 17:18

## 2024-08-16 RX ADMIN — HYDROMORPHONE HYDROCHLORIDE 0.2 MG: 0.2 INJECTION, SOLUTION INTRAMUSCULAR; INTRAVENOUS; SUBCUTANEOUS at 21:10

## 2024-08-16 RX ADMIN — EPHEDRINE SULFATE 10 MG: 5 INJECTION INTRAVENOUS at 12:52

## 2024-08-16 RX ADMIN — SODIUM CHLORIDE, POTASSIUM CHLORIDE, SODIUM LACTATE AND CALCIUM CHLORIDE: 600; 310; 30; 20 INJECTION, SOLUTION INTRAVENOUS at 16:17

## 2024-08-16 RX ADMIN — PHENYLEPHRINE HYDROCHLORIDE 50 MCG: 10 INJECTION INTRAVENOUS at 13:49

## 2024-08-16 ASSESSMENT — ACTIVITIES OF DAILY LIVING (ADL)
ADLS_ACUITY_SCORE: 26
ADLS_ACUITY_SCORE: 26
ADLS_ACUITY_SCORE: 24
ADLS_ACUITY_SCORE: 26

## 2024-08-16 ASSESSMENT — COPD QUESTIONNAIRES: COPD: 1

## 2024-08-16 ASSESSMENT — LIFESTYLE VARIABLES: TOBACCO_USE: 1

## 2024-08-16 ASSESSMENT — ENCOUNTER SYMPTOMS: SEIZURES: 0

## 2024-08-16 NOTE — ANESTHESIA CARE TRANSFER NOTE
Patient: Flaco Hickman    Procedure: Procedure(s):  Robot-assisted Laparoscopic Hiatal Hernia Repair, Esophagogastroscopy, Gastropexy, Right Chest Tube       Diagnosis: Hiatal hernia with gastroesophageal reflux [K44.9, K21.9]  Diagnosis Additional Information: No value filed.    Anesthesia Type:   General     Note:    Oropharynx: oropharynx clear of all foreign objects  Level of Consciousness: awake  Oxygen Supplementation: face mask  Level of Supplemental Oxygen (L/min / FiO2): 6 LPM  Independent Airway: airway patency satisfactory and stable  Dentition: dentition unchanged  Vital Signs Stable: post-procedure vital signs reviewed and stable  Report to RN Given: handoff report given  Patient transferred to: PACU    Handoff Report: Identifed the Patient, Identified the Reponsible Provider, Reviewed the pertinent medical history, Discussed the surgical course, Reviewed Intra-OP anesthesia mangement and issues during anesthesia, Set expectations for post-procedure period and Allowed opportunity for questions and acknowledgement of understanding      Vitals:  Vitals Value Taken Time   /79 08/16/24 1739   Temp     Pulse 77 08/16/24 1741   Resp 12 08/16/24 1732   SpO2 93 % 08/16/24 1741   Vitals shown include unfiled device data.    Electronically Signed By: CHAVA Blackman CRNA  August 16, 2024  5:42 PM

## 2024-08-16 NOTE — ANESTHESIA PROCEDURE NOTES
Airway       Patient location during procedure: OR       Procedure Start/Stop Times: 8/16/2024 12:44 PM  Staff -        CRNA: Irwin Donahue APRN CRNA       Performed By: CRNA  Consent for Airway        Urgency: elective  Indications and Patient Condition       Indications for airway management: domitila-procedural       Induction type:intravenous       Mask difficulty assessment: 1 - vent by mask    Final Airway Details       Final airway type: endotracheal airway       Successful airway: ETT - single and Oral  Endotracheal Airway Details        ETT size (mm): 7.5       Cuffed: yes       Successful intubation technique: direct laryngoscopy       DL Blade Type: Dang 2       Grade View of Cords: 1       Adjucts: stylet       Position: Right       Measured from: gums/teeth       Secured at (cm): 22       Bite block used: Oral Airway (At end of case)    Post intubation assessment        Placement verified by: capnometry, equal breath sounds and chest rise        Number of attempts at approach: 1       Number of other approaches attempted: 0       Secured with: tape       Ease of procedure: easy       Dentition: Intact and Unchanged    Medication(s) Administered   Medication Administration Time: 8/16/2024 12:44 PM

## 2024-08-16 NOTE — ANESTHESIA PREPROCEDURE EVALUATION
Anesthesia Pre-Procedure Evaluation    Patient: Flaco Hickman   MRN: [id number] : 1950        Procedure : Procedure(s):  Robot-assisted laparoscopic hiatal hernia repair, partial fundoplication, possible gastroplasty, gastrostomy **Latex Allergy**          Past Medical History:   Diagnosis Date    Allergies     Dependence on nocturnal oxygen therapy     Depression     Hiatal hernia with gastroesophageal reflux     Hypercholesteremia     Hypothyroidism     Idiopathic angio-edema-urticaria     Migraine     Nerve pain     right hand    SNHL (sensorineural hearing loss)     Stopped smoking with 50 pack year history       Past Surgical History:   Procedure Laterality Date    BLEPHAROPLASTY, BROW LIFT, COMBINED Bilateral 11/15/2021    Procedure: Both upper eyelid blepharoplasty and direct browplasty;  Surgeon: Desirae Sanchez MD;  Location: [facility] OR    CHEILECTOMY      Toe    DENTAL SURGERY      L5 laminectomy      right total knee Right       Allergies   Allergen Reactions    Latex Other (See Comments)     HIVES    Aspirin Nausea and Vomiting    Nsaids Other (See Comments)     Causes ulcers    Diclofenac      Other reaction(s): Unknown Reaction      Social History     Tobacco Use    Smoking status: Former     Types: Cigarettes    Smokeless tobacco: Never   Substance Use Topics    Alcohol use: Not Currently      Wt Readings from Last 1 Encounters:   24 74.1 kg (163 lb 5.8 oz)        Anesthesia Evaluation   Pt has had prior anesthetic. Type: General.    No history of anesthetic complications       ROS/MED HX  ENT/Pulmonary:     (+)                tobacco use,          COPD, O2 dependent, during Nighttime, 2L liters/min,        (-) asthma, sleep apnea and recent URI   Neurologic: Comment: Hearing loss    (+)      migraines (has received botox),                       (-) no seizures and no CVA   Cardiovascular:       METS/Exercise Tolerance: 4 - Raking leaves, gardening Comment: Able to do yard work,  "shovel snow, ascend stairs. Endorses SOB w/ activity, denies CP.    Hematologic:  - neg hematologic  ROS  (-) history of blood clots and history of blood transfusion   Musculoskeletal: Comment: LBP, s/p L5 laminectomy    S/p right TKA        GI/Hepatic:     (+)      hiatal hernia,           (-) liver disease   Renal/Genitourinary:    (-) renal disease   Endo:     (+)          thyroid problem, hypothyroidism,        (-) Type II DM   Psychiatric/Substance Use: Comment: PTSD    (+) psychiatric history depression       Infectious Disease:  - neg infectious disease ROS     Malignancy:  - neg malignancy ROS     Other: Comment: Idiopathic urticaria; angioedema. Takes prednisone prn.             Physical Exam    Airway        Mallampati: III   TM distance: > 3 FB   Neck ROM: full   Mouth opening: > 3 cm    Respiratory Devices and Support         Dental       (+) Multiple visibly decayed, broken teeth      Cardiovascular          Rhythm and rate: regular and normal     Pulmonary                   OUTSIDE LABS:  CBC: No results found for: \"WBC\", \"HGB\", \"HCT\", \"PLT\"  BMP:   Lab Results   Component Value Date     08/16/2024    POTASSIUM 4.5 08/16/2024    CHLORIDE 106 08/16/2024    CO2 24 08/16/2024    BUN 16.0 08/16/2024    CR 1.16 08/16/2024    GLC 95 08/16/2024    GLC 89 08/16/2024     COAGS: No results found for: \"PTT\", \"INR\", \"FIBR\"  POC: No results found for: \"BGM\", \"HCG\", \"HCGS\"  HEPATIC: No results found for: \"ALBUMIN\", \"PROTTOTAL\", \"ALT\", \"AST\", \"GGT\", \"ALKPHOS\", \"BILITOTAL\", \"BILIDIRECT\", \"DARIEN\"  OTHER:   Lab Results   Component Value Date    ANDRES 9.2 08/16/2024       Anesthesia Plan    ASA Status:  3    NPO Status:  ELEVATED Aspiration Risk/Unknown    Anesthesia Type: General.     - Airway: ETT   Induction: RSI.   Maintenance: Inhalation.   Techniques and Equipment:     - Lines/Monitors: 2nd IV     Consents    Anesthesia Plan(s) and associated risks, benefits, and realistic alternatives discussed. Questions " "answered and patient/representative(s) expressed understanding.     - Discussed: Risks, Benefits and Alternatives for BOTH SEDATION and the PROCEDURE were discussed     - Discussed with:  Spouse, Patient      - Extended Intubation/Ventilatory Support Discussed: No.      - Patient is DNR/DNI Status: No     Use of blood products discussed: Yes.     - Discussed with: Patient.     - Consented: consented to blood products     Postoperative Care    Pain management: IV analgesics, Oral pain medications, Multi-modal analgesia.   PONV prophylaxis: Ondansetron (or other 5HT-3), Dexamethasone or Solumedrol     Comments:               Papo Ruiz MD    I have reviewed the pertinent notes and labs in the chart from the past 30 days and (re)examined the patient.  Any updates or changes from those notes are reflected in this note.              # Overweight: Estimated body mass index is 25.59 kg/m  as calculated from the following:    Height as of this encounter: 1.702 m (5' 7\").    Weight as of this encounter: 74.1 kg (163 lb 5.8 oz).      "

## 2024-08-16 NOTE — BRIEF OP NOTE
[facility]    Brief Operative Note    Pre-operative diagnosis: Hiatal hernia with gastroesophageal reflux [K44.9, K21.9]  Post-operative diagnosis Same as pre-operative diagnosis    Procedure: Robot-assisted Laparoscopic Hiatal Hernia Repair, Esophagogastroscopy, Gastropexy, Right Chest Tube, N/A - Chest    Surgeon: Surgeons and Role:     * Darren Davidson MD - Primary     * Paulie Esquivel PA-PILAR - Assisting     * Consuelo Feliciano MD - Resident - Assisting  Anesthesia: General   Estimated Blood Loss: 30 ml    Drains:   Right 19F pleural eric drain  18F nasogastric tube    Specimens:   ID Type Source Tests Collected by Time Destination   1 : Paraesophageal lymphnode Tissue Lymph Node(s) SURGICAL PATHOLOGY EXAM Darren Davidson MD 8/16/2024  4:48 PM    2 : Hernia Sac Tissue Hernia Sac SURGICAL PATHOLOGY EXAM Darren Davidson MD 8/16/2024  5:05 PM      Findings:   Successful hernia repair, no immediate concerns .  Complications: None.  Implants: * No implants in log *

## 2024-08-17 ENCOUNTER — APPOINTMENT (OUTPATIENT)
Dept: GENERAL RADIOLOGY | Facility: CLINIC | Age: 74
DRG: 328 | End: 2024-08-17
Payer: COMMERCIAL

## 2024-08-17 ENCOUNTER — APPOINTMENT (OUTPATIENT)
Dept: GENERAL RADIOLOGY | Facility: CLINIC | Age: 74
DRG: 328 | End: 2024-08-17
Attending: PHYSICIAN ASSISTANT
Payer: COMMERCIAL

## 2024-08-17 ENCOUNTER — APPOINTMENT (OUTPATIENT)
Dept: OCCUPATIONAL THERAPY | Facility: CLINIC | Age: 74
DRG: 328 | End: 2024-08-17
Attending: PHYSICIAN ASSISTANT
Payer: COMMERCIAL

## 2024-08-17 LAB
ANION GAP SERPL CALCULATED.3IONS-SCNC: 11 MMOL/L (ref 7–15)
BUN SERPL-MCNC: 20.3 MG/DL (ref 8–23)
CALCIUM SERPL-MCNC: 8.2 MG/DL (ref 8.8–10.4)
CHLORIDE SERPL-SCNC: 106 MMOL/L (ref 98–107)
CREAT SERPL-MCNC: 1.21 MG/DL (ref 0.67–1.17)
EGFRCR SERPLBLD CKD-EPI 2021: 63 ML/MIN/1.73M2
ERYTHROCYTE [DISTWIDTH] IN BLOOD BY AUTOMATED COUNT: 12.4 % (ref 10–15)
GLUCOSE BLDC GLUCOMTR-MCNC: 120 MG/DL (ref 70–99)
GLUCOSE SERPL-MCNC: 117 MG/DL (ref 70–99)
HCO3 SERPL-SCNC: 23 MMOL/L (ref 22–29)
HCT VFR BLD AUTO: 37 % (ref 40–53)
HGB BLD-MCNC: 12 G/DL (ref 13.3–17.7)
MCH RBC QN AUTO: 31.1 PG (ref 26.5–33)
MCHC RBC AUTO-ENTMCNC: 32.4 G/DL (ref 31.5–36.5)
MCV RBC AUTO: 96 FL (ref 78–100)
PLATELET # BLD AUTO: 119 10E3/UL (ref 150–450)
POTASSIUM SERPL-SCNC: 4.6 MMOL/L (ref 3.4–5.3)
RBC # BLD AUTO: 3.86 10E6/UL (ref 4.4–5.9)
SODIUM SERPL-SCNC: 140 MMOL/L (ref 135–145)
WBC # BLD AUTO: 9.9 10E3/UL (ref 4–11)

## 2024-08-17 PROCEDURE — 71045 X-RAY EXAM CHEST 1 VIEW: CPT | Mod: 26 | Performed by: RADIOLOGY

## 2024-08-17 PROCEDURE — 97535 SELF CARE MNGMENT TRAINING: CPT | Mod: GO | Performed by: OCCUPATIONAL THERAPIST

## 2024-08-17 PROCEDURE — 250N000011 HC RX IP 250 OP 636: Performed by: PHYSICIAN ASSISTANT

## 2024-08-17 PROCEDURE — 85027 COMPLETE CBC AUTOMATED: CPT | Performed by: PHYSICIAN ASSISTANT

## 2024-08-17 PROCEDURE — 258N000003 HC RX IP 258 OP 636: Performed by: PHYSICIAN ASSISTANT

## 2024-08-17 PROCEDURE — 250N000013 HC RX MED GY IP 250 OP 250 PS 637

## 2024-08-17 PROCEDURE — 97165 OT EVAL LOW COMPLEX 30 MIN: CPT | Mod: GO | Performed by: OCCUPATIONAL THERAPIST

## 2024-08-17 PROCEDURE — 999N000065 XR CHEST PORT 1 VIEW

## 2024-08-17 PROCEDURE — 71045 X-RAY EXAM CHEST 1 VIEW: CPT

## 2024-08-17 PROCEDURE — 999N000147 HC STATISTIC PT IP EVAL DEFER

## 2024-08-17 PROCEDURE — 80048 BASIC METABOLIC PNL TOTAL CA: CPT | Performed by: PHYSICIAN ASSISTANT

## 2024-08-17 PROCEDURE — 36415 COLL VENOUS BLD VENIPUNCTURE: CPT | Performed by: PHYSICIAN ASSISTANT

## 2024-08-17 PROCEDURE — 250N000013 HC RX MED GY IP 250 OP 250 PS 637: Performed by: PHYSICIAN ASSISTANT

## 2024-08-17 PROCEDURE — 120N000002 HC R&B MED SURG/OB UMMC

## 2024-08-17 RX ORDER — ACETAMINOPHEN 325 MG/10.15ML
650 LIQUID ORAL EVERY 4 HOURS PRN
Status: DISCONTINUED | OUTPATIENT
Start: 2024-08-17 | End: 2024-08-19 | Stop reason: HOSPADM

## 2024-08-17 RX ORDER — HYDROXYZINE HYDROCHLORIDE 25 MG/1
25 TABLET, FILM COATED ORAL EVERY 6 HOURS PRN
Status: DISCONTINUED | OUTPATIENT
Start: 2024-08-17 | End: 2024-08-19 | Stop reason: HOSPADM

## 2024-08-17 RX ORDER — GABAPENTIN 250 MG/5ML
600 SOLUTION ORAL AT BEDTIME
Status: DISCONTINUED | OUTPATIENT
Start: 2024-08-17 | End: 2024-08-19 | Stop reason: HOSPADM

## 2024-08-17 RX ORDER — LORATADINE 10 MG/1
10 TABLET ORAL EVERY MORNING
Status: DISCONTINUED | OUTPATIENT
Start: 2024-08-17 | End: 2024-08-19 | Stop reason: HOSPADM

## 2024-08-17 RX ORDER — ACETAMINOPHEN 325 MG/10.15ML
650 LIQUID ORAL EVERY 4 HOURS
Status: DISCONTINUED | OUTPATIENT
Start: 2024-08-17 | End: 2024-08-19 | Stop reason: HOSPADM

## 2024-08-17 RX ORDER — CETIRIZINE HYDROCHLORIDE 10 MG/1
10 TABLET ORAL EVERY MORNING
Status: DISCONTINUED | OUTPATIENT
Start: 2024-08-17 | End: 2024-08-19 | Stop reason: HOSPADM

## 2024-08-17 RX ADMIN — LEVOTHYROXINE SODIUM 75 MCG: 75 TABLET ORAL at 12:45

## 2024-08-17 RX ADMIN — GABAPENTIN 600 MG: 300 CAPSULE ORAL at 00:06

## 2024-08-17 RX ADMIN — BUSPIRONE HYDROCHLORIDE 15 MG: 15 TABLET ORAL at 12:45

## 2024-08-17 RX ADMIN — BUPROPION HYDROCHLORIDE 150 MG: 150 TABLET, EXTENDED RELEASE ORAL at 12:46

## 2024-08-17 RX ADMIN — GABAPENTIN 600 MG: 250 SUSPENSION ORAL at 22:18

## 2024-08-17 RX ADMIN — MEMANTINE 5 MG: 5 TABLET ORAL at 12:46

## 2024-08-17 RX ADMIN — Medication 7.5 MG: at 16:21

## 2024-08-17 RX ADMIN — ACETAMINOPHEN 650 MG: 325 SOLUTION ORAL at 16:21

## 2024-08-17 RX ADMIN — ACETAMINOPHEN 650 MG: 325 SOLUTION ORAL at 20:19

## 2024-08-17 RX ADMIN — ACETAMINOPHEN 975 MG: 325 TABLET ORAL at 00:06

## 2024-08-17 RX ADMIN — CETIRIZINE HYDROCHLORIDE 10 MG: 10 TABLET, FILM COATED ORAL at 16:21

## 2024-08-17 RX ADMIN — SODIUM CHLORIDE, POTASSIUM CHLORIDE, SODIUM LACTATE AND CALCIUM CHLORIDE: 600; 310; 30; 20 INJECTION, SOLUTION INTRAVENOUS at 04:30

## 2024-08-17 RX ADMIN — LIDOCAINE 1 PATCH: 4 PATCH TOPICAL at 09:46

## 2024-08-17 RX ADMIN — LORATADINE 10 MG: 10 TABLET ORAL at 16:21

## 2024-08-17 RX ADMIN — ACETAMINOPHEN 650 MG: 325 SOLUTION ORAL at 12:35

## 2024-08-17 RX ADMIN — SODIUM CHLORIDE, POTASSIUM CHLORIDE, SODIUM LACTATE AND CALCIUM CHLORIDE: 600; 310; 30; 20 INJECTION, SOLUTION INTRAVENOUS at 14:02

## 2024-08-17 RX ADMIN — ENOXAPARIN SODIUM 40 MG: 40 INJECTION SUBCUTANEOUS at 12:34

## 2024-08-17 RX ADMIN — HYDROMORPHONE HYDROCHLORIDE 0.2 MG: 0.2 INJECTION, SOLUTION INTRAMUSCULAR; INTRAVENOUS; SUBCUTANEOUS at 04:30

## 2024-08-17 ASSESSMENT — ACTIVITIES OF DAILY LIVING (ADL)
ADLS_ACUITY_SCORE: 37
ADLS_ACUITY_SCORE: 29
ADLS_ACUITY_SCORE: 35
ADLS_ACUITY_SCORE: 35
ADLS_ACUITY_SCORE: 39
ADLS_ACUITY_SCORE: 35
ADLS_ACUITY_SCORE: 29
ADLS_ACUITY_SCORE: 29
ADLS_ACUITY_SCORE: 39
ADLS_ACUITY_SCORE: 35
ADLS_ACUITY_SCORE: 29
ADLS_ACUITY_SCORE: 35
ADLS_ACUITY_SCORE: 39
ADLS_ACUITY_SCORE: 29
ADLS_ACUITY_SCORE: 29
ADLS_ACUITY_SCORE: 35
ADLS_ACUITY_SCORE: 39
PREVIOUS_RESPONSIBILITIES: MEAL PREP;HOUSEKEEPING;LAUNDRY;SHOPPING;YARDWORK;MEDICATION MANAGEMENT;FINANCES;DRIVING
ADLS_ACUITY_SCORE: 35
ADLS_ACUITY_SCORE: 39
ADLS_ACUITY_SCORE: 39
ADLS_ACUITY_SCORE: 29
ADLS_ACUITY_SCORE: 35
ADLS_ACUITY_SCORE: 28

## 2024-08-17 NOTE — PLAN OF CARE
Physical Therapy: Orders received. Chart reviewed and discussed with care team.? Physical Therapy not indicated due to patient mobilizing well post op. Discussed with OT, no balance concerns, able to ambulate halls, OT will continue to follow.? Defer discharge recommendations to OT/medical team.? Will complete orders.

## 2024-08-17 NOTE — ANESTHESIA POSTPROCEDURE EVALUATION
Patient: Flaco Hickman    Procedure: Procedure(s):  Robot-assisted Laparoscopic Hiatal Hernia Repair, Esophagogastroscopy, Gastropexy, Right Chest Tube       Anesthesia Type:  General    Note:  Disposition: Inpatient   Postop Pain Control: Uneventful            Sign Out: Well controlled pain   PONV: No   Neuro/Psych: Uneventful            Sign Out: Acceptable/Baseline neuro status   Airway/Respiratory: Uneventful            Sign Out: Acceptable/Baseline resp. status   CV/Hemodynamics: Uneventful            Sign Out: Acceptable CV status; No obvious hypovolemia; No obvious fluid overload   Other NRE:    DID A NON-ROUTINE EVENT OCCUR? No           Last vitals:  Vitals Value Taken Time   /66 08/16/24 2000   Temp 37  C (98.6  F) 08/16/24 1930   Pulse 83 08/16/24 2005   Resp 14 08/16/24 2005   SpO2 94 % 08/16/24 2005   Vitals shown include unfiled device data.    Electronically Signed By: Carlyle Kelly MD  August 16, 2024  8:06 PM

## 2024-08-17 NOTE — PROGRESS NOTES
Interval Progress Note    Over the course of the night, patient reported feeling like his tylenol and gabapentin pills were stuck in the back of his throat. Patient made attempts to aid pill movement by drinking water but he had minimal emesis with each try. On my return to patient's bedside, nurse alerted me that NGT landmark changed from 55 to 47. NGT eventually came out. Patient is NPO. Update was dicussed with fellow.      Joe Sanders MD  Urology, PGY-1

## 2024-08-17 NOTE — PHARMACY-ADMISSION MEDICATION HISTORY
Pharmacist Admission Medication History    Admission medication history is complete. The information provided in this note is only as accurate as the sources available at the time of the update.    Information Source(s): Patient, Family member, and CareEverywhere/SureScripts via  handwritten med list, discussion with nurse.    Pertinent Information: Handwritten med list provided by wife (who sets up Cristobal's medications). Reviewed list and also careverywhere for more information when needed.    Changes made to PTA medication list:  Added: None  Deleted: None  Changed: atorvastatin from 80mg at bedtime to 40mg at bedtime, prednisone from daily to daily prn and added indication.    Allergies reviewed with patient and updates made in EHR: no    Medication History Completed By: Evan Olson [facility] 8/17/2024 2:27 PM    PTA Med List   Medication Sig Last Dose    acetaminophen (TYLENOL) 325 MG tablet Take 325-650 mg by mouth 8/15/2024    albuterol (PROAIR HFA/PROVENTIL HFA/VENTOLIN HFA) 108 (90 Base) MCG/ACT inhaler Inhale 2 puffs into the lungs 4 times daily     ascorbic acid (VITAMIN C) 250 MG CHEW chewable tablet Take 250 mg by mouth every morning 8/15/2024    atorvastatin (LIPITOR) 80 MG tablet Take 40 mg by mouth every evening 8/15/2024    buPROPion (ZYBAN) 150 MG 12 hr tablet Take 150 mg by mouth every morning 8/16/2024 at 0800    busPIRone (BUSPAR) 15 MG tablet Take 15 mg by mouth 2 times daily 8/16/2024 at 0800    cetirizine (ZYRTEC) 10 MG tablet Take 10 mg by mouth every morning 8/16/2024 at 0800    cholecalciferol (VITAMIN D3) 25 mcg (1000 units) capsule Take 1 capsule by mouth every morning 8/16/2024 at 0800    escitalopram (LEXAPRO) 5 MG tablet Take 7.5 mg by mouth every morning 8/16/2024 at 0800    ferrous sulfate (FEROSUL) 325 (65 Fe) MG tablet Take 325 mg by mouth daily (with breakfast) 8/15/2024    gabapentin (NEURONTIN) 300 MG capsule Take 600 mg by mouth at bedtime 8/15/2024    hypromellose-dextran  (ARTIFICAL TEARS) 0.1-0.3 % ophthalmic solution Apply 1 drop to eye 8/16/2024 at 0800    levothyroxine (SYNTHROID/LEVOTHROID) 75 MCG tablet Take 75 mcg by mouth every morning 8/16/2024 at 0800    loratadine (CLARITIN) 10 MG tablet Take 10 mg by mouth every morning 8/16/2024 at 0800    memantine (NAMENDA) 5 MG tablet Take 5 mg by mouth every morning 8/16/2024 at 0800    omeprazole (PRILOSEC) 20 MG DR capsule Take 20 mg by mouth every morning 8/16/2024 at 0800    predniSONE (DELTASONE) 20 MG tablet Take 20 mg by mouth daily as needed (idiopathic urticaria flare) Past Month    vitamin B complex with vitamin C (VITAMIN  B COMPLEX) tablet Take 1 tablet by mouth every morning 8/15/2024

## 2024-08-17 NOTE — PROGRESS NOTES
"   08/17/24 1234   Appointment Info   Signing Clinician's Name / Credentials (OT) Noreen LamarOTR/L   Living Environment   People in Home spouse   Current Living Arrangements house   Home Accessibility stairs within home;stairs to enter home   Number of Stairs, Main Entrance 4   Stair Railings, Main Entrance railings safe and in good condition   Number of Stairs, Within Home, Primary greater than 10 stairs  (flight to basement, does not have to use)   Stair Railings, Within Home, Primary railings safe and in good condition   Transportation Anticipated family or friend will provide;car, drives self   Living Environment Comments pt. lives with spouse has step in shower, good family support   Self-Care   Usual Activity Tolerance good   Current Activity Tolerance moderate   Equipment Currently Used at Home none   Fall history within last six months no   Activity/Exercise/Self-Care Comment pt. very active/indep. at baseline wtih all ADLs/IADLs; just got back from a motorcycle trip   Instrumental Activities of Daily Living (IADL)   Previous Responsibilities meal prep;housekeeping;laundry;shopping;yardwork;medication management;finances;driving   IADL Comments family can A prn with IADls   General Information   Onset of Illness/Injury or Date of Surgery 08/16/24   Referring Physician Paulie Esquivel PA-C   Patient/Family Therapy Goal Statement (OT) to get home and return to [facility]   Additional Occupational Profile Info/Pertinent History of Current Problem Per chart pt. s/p \"Robot-assisted Laparoscopic Hiatal Hernia Repair, Esophagogastroscopy, Gastropexy, Right Chest Tube\"   Existing Precautions/Restrictions abdominal   General Observations and Info act. orders; amb. with A   Cognitive Status Examination   Orientation Status orientation to person, place and time   Visual Perception   Visual Impairment/Limitations corrective lenses full-time   Pain Assessment   Patient Currently in Pain   (general post op pain)   Range " of Motion Comprehensive   General Range of Motion bilateral upper extremity ROM WFL   Strength Comprehensive (MMT)   Comment, General Manual Muscle Testing (MMT) Assessment BUE str. not formally tested; str. appears WFL, general post op weakness   Bathing Assessment/Intervention   [place] Level (Bathing) contact guard assist   Upper Body Dressing Assessment/Training   [place] Level (Upper Body Dressing) modified independence;independent;set up   Lower Body Dressing Assessment/Training   [place] Level (Lower Body Dressing) minimum assist (75% patient effort);set up   Grooming Assessment/Training   [place] Level (Grooming) set up   Toileting   [place] Level (Toileting) contact guard assist   Clinical Impression   Criteria for Skilled Therapeutic Interventions Met (OT) Yes, treatment indicated   OT Diagnosis impaired ADLs/mobility   Influenced by the following impairments post op prec., CT   OT Problem List-Impairments impacting ADL activity tolerance impaired;pain;post-surgical precautions   ADL comments/analysis below baseline with ADLs/mobility   Assessment of Occupational Performance 3-5 Performance Deficits   Identified Performance Deficits dressing, toileting, bathing, home mgmt.   Planned Therapy Interventions (OT) ADL retraining;transfer training;home program guidelines;progressive activity/exercise   Clinical Decision Making Complexity (OT) problem focused assessment/low complexity   Risk & Benefits of therapy have been explained evaluation/treatment results reviewed;care plan/treatment goals reviewed;risks/benefits reviewed;current/potential barriers reviewed;participants voiced agreement with care plan;participants included;patient;daughter   OT Total Evaluation Time   OT Eval, Low Complexity Minutes (25355) 10   OT Goals   Therapy Frequency (OT) Daily   OT Predicted Duration/Target Date for Goal Attainment 08/21/24   OT Discharge Planning   OT Plan OT PLAN: review abd. prec., flat  bed mob., ADLs/item ret., stairs, standing g/h   OT Discharge Recommendation (DC Rec) home with assist   OT Rationale for DC Rec pt. doing well post op, has good home support. Anticipate pt. will d/c home with A from family for heavier IADLs.   OT Brief overview of current status CGA/S with BADLs and functional mob. with iv pole(mostly 2/2 iv running and to carry CT)   Total Session Time   Total Session Time (sum of timed and untimed services) 10

## 2024-08-17 NOTE — PROVIDER NOTIFICATION
Provider notified (name): MOUNIKA ENNIS [ Msg Id [id number] ]  Thoracic  Reason for notification: Pt refused morning PO meds d/t difficulty swallowing meds last night.     Response from provider: none

## 2024-08-17 NOTE — PROGRESS NOTES
"  Thoracic Surgery Progress Note  Surgery Cross-Cover  Post Op Check    08/16/2024    Flaco Hickman is a 74 year old male POD#0 s/p Procedure(s):  Robot-assisted Laparoscopic Hiatal Hernia Repair, Esophagogastroscopy, Gastropexy, Right Chest Tube for Pre-Op Diagnosis Codes:     * Hiatal hernia with gastroesophageal reflux [K44.9, K21.9]    Pt reports their pain is moderately controlled with current regiment. He endorses shoulder pain and 4/10 right chest pain that is non-radiating. Denies pressure sensation in chest, diaphoresis. nausea, SOB, chest pain, or dizziness. Patient is not passing flatus or having bowel movements and Is voiding spontaneously.       /85 (BP Location: Right arm)   Pulse 88   Temp 98.6  F (37  C) (Oral)   Resp 16   Ht 1.702 m (5' 7\")   Wt 74.1 kg (163 lb 5.8 oz)   SpO2 90%   BMI 25.59 kg/m      Gen: A&O x4, NAD   Chest: breathing non-labored on 2L NC  Abdomen: soft, non-tender, non-distended. NGT on suction with brown green output   Incision: clean, dry, intact   Extremities: warm and well perfused  Devices: right chest tube with 500 mL sanguinous output     A/P: No acute post-op issues. Continue plan of care per primary team. Please call with any questions. CXR obtained to assess CT placement.     Joe Sanders MD  Urology, PGY-1       "

## 2024-08-17 NOTE — PLAN OF CARE
"Cares 0700 to 1900    /65 (BP Location: Left arm)   Pulse 62   Temp 97.8  F (36.6  C) (Oral)   Resp 18   Ht 1.702 m (5' 7\")   Wt 76.1 kg (167 lb 11.2 oz)   SpO2 92%   BMI 26.27 kg/m      Goal Outcome Evaluation:    Plan of Care Reviewed With: patient, spouse  Overall Patient Progress: no changeOverall Patient Progress: no change    Outcome Evaluation: Intermittent pain in R upper chest/shoulder/back area, managed with tylenol. Pt able to swallow small meds 1 at a time, able to swallow meds crushed & mixed in small amount of water, also tolerating ice chips well. Denies SOB, coarse crackles on posterior R lung. Up in chair 2x, ambulated 2x w/IV pole & SBA, tolerating incentive spirometer well. R chest tube to water seal, 40 ml dark red drainage this shift, total of 150 ml in drainage chamber. Reinforced dressing at CT insertion site. Not passing gas, +BS. Encourage ambulation & ice chips.      "

## 2024-08-17 NOTE — PROGRESS NOTES
THORACIC & FOREGUT SURGERY    S: NGT fell out overnight, denies any nausea/vomiting this AM. Having pain, reports being uncomfortable especially with swallowing.     O:  Vitals:    08/17/24 0528 08/17/24 0700 08/17/24 0936 08/17/24 0942   BP: 132/77 122/72     BP Location: Left arm      Patient Position: Supine      Pulse: 80 69     Resp: 20 17     Temp: 99  F (37.2  C)  98  F (36.7  C)    TempSrc: Oral  Axillary    SpO2: 96% 94%  90%   Weight:       Height:           A&Ox3, NAD  Breathing non-labored on room air, Chest tube in place with minimal thin serosanguinous output  RRR  Soft, NDNT  Distal extremities warm      Chest tube output 60cc/24h, minimal air leak, no tidaling  Incisions covered with minimal output    BMP  Recent Labs   Lab 08/17/24  0713 08/17/24  0603 08/16/24  2323 08/16/24  1140 08/16/24  1128     --   --  140  --    POTASSIUM 4.6  --   --  4.5  --    CHLORIDE 106  --   --  106  --    ANDRES 8.2*  --   --  9.2  --    CO2 23  --   --  24  --    BUN 20.3  --   --  16.0  --    CR 1.21*  --  1.23* 1.16  --    * 120*  --  95 89     CBC  Recent Labs   Lab 08/17/24  0713   WBC 9.9   RBC 3.86*   HGB 12.0*   HCT 37.0*   MCV 96   MCH 31.1   MCHC 32.4   RDW 12.4   *     INR   No lab results found in last 7 days.     Most recent labs and images reviewed     A/P: Flaco Hickman is a 74 year old male with PMHx notable for ULISES, HLD, Depression, chronic urticaria, PTSD, hearing loss, hypothyroid who is now s/p robotic hiatal hernia repait on 8/16/24. Overall doing well post-op though having some discomfort with swallowing and pain at the chest tube site.   Neuro:  S tylenol, S gabapentin, Lidocaine patch, PRN oxy/dilaudid for pain; PTA buproprion, buspar, lexapro, memantine  Resp: Wean O2 as tolerated, Frequent IS use, Keep chest tube in place today  CV: MARK  GI/FEN: NPO, Continue LR, Patient reporting some difficulty with swallowing so will change medications to liquid as possible  Endo:  PTA levothyroxine  ID: MARK, Follow WBC  Heme: MARK, Follow CBC, Maintain hgb > 7  Prophylaxis: qdaily Lovenox  Pathology: Pending  Dispo: 7C, Home in 1-2 days pending pain control and improved PO  POSTOP COMPLICATIONS: None    Patient seen and discussed with thoracic surgery fellow and staff    Gloria Shook MD    General Surgery, PGY-3

## 2024-08-17 NOTE — SUMMARY OF CARE
(Change note type to summary of care)  Transferred from:  PACU  Report received from:           2 RN skin assessment completed by: Jolene RN and Edmundo RN      - Findings (add LDA if needed):   R chest tube site  NG at landmark of 55  5 lap sites    Care plan (primary problem) and education initiated if not already done: yes  MDRO education done if applicable: yes  Pt informed about policy regarding no IV pumps off unit: yes  Suction set up in room? yes  Flu shot ordered? (October-April only): no  Detailed Belongings: 2 pt belonging bags that contained one outfit: red shirt, black pants, blue brief, black shoes, black socks. Small black toiletry bag filled with glasses, bilateral hearing aids, batteries for hearing aids, soap, toothbrush.

## 2024-08-17 NOTE — PLAN OF CARE
"Goal Outcome Evaluation:      Plan of Care Reviewed With: patient    Overall Patient Progress: no change Overall Patient Progress: no change     Pain: rated 3-4 on scale of 1 thru 10;  R chest pain and R back pain when sitting on edge, he described it as sharp  Neuro: A&O x4, VSS except BP during discomfort  Respiratory: initially on 2.5L of capno but pt refused. Charge, Sandi, consulted with pt and he allowed 3L of oxymask.   Cardiac/Neurovascular: WDL  GI/: last BM on 8-16 evening, no flatus, voids via urinal. Bowel sounds heard in all quadrants. 5 lap sites  Nutrition: NPO except meds, ice chips, and sips of water  Activity: SBA  Skin: sacral mepilex on  Lines: NG tube fell out per pt around 3am, Joe Sanders MD, of thoracic surgery, notified, output of 300mL; R chest tube had an output of 50mL but chest tube collection was knocked down accidentally; 2 PIV, L infusing LR running 100mL/hr and R PIV saline locked  Events this shift:    After pt received 2300 meds, he reported to have difficulty swallowing meds past his throat. He stated that he can \"taste\" the medications. Verified and educated with pt to see if the discomfort he is having is related to NG tube, pt denies. Contacted provider, Joe Sanders in regards to his sx. Provider ordered chest xray. After chest xray, writer noticed that NG landmark significantly changed. Asked pt if he pulled on his NG tube, and he denies. He states that he was repositioning NG tube. Continued to contact provider and charge nurse. After 5 minutes, writer visited pt again and NG tube was slipping out of nasal cavity as pt was sitting on edge of bed hunched over. After another 5 minutes, pt reported that NG tube was completed dislocated and removed from his nasal cavity. He stated that he can taste the medications he received earlier dissolving in his throat. He denied any discomfort after. No replacement needed per Dr. Sanders; day team will see pt. " She held 5am Tylenol PO and ordered oral Tylenol.

## 2024-08-18 ENCOUNTER — APPOINTMENT (OUTPATIENT)
Dept: GENERAL RADIOLOGY | Facility: CLINIC | Age: 74
DRG: 328 | End: 2024-08-18
Attending: PHYSICIAN ASSISTANT
Payer: COMMERCIAL

## 2024-08-18 ENCOUNTER — APPOINTMENT (OUTPATIENT)
Dept: GENERAL RADIOLOGY | Facility: CLINIC | Age: 74
DRG: 328 | End: 2024-08-18
Attending: THORACIC SURGERY (CARDIOTHORACIC VASCULAR SURGERY)
Payer: COMMERCIAL

## 2024-08-18 ENCOUNTER — APPOINTMENT (OUTPATIENT)
Dept: OCCUPATIONAL THERAPY | Facility: CLINIC | Age: 74
DRG: 328 | End: 2024-08-18
Attending: THORACIC SURGERY (CARDIOTHORACIC VASCULAR SURGERY)
Payer: COMMERCIAL

## 2024-08-18 LAB — GLUCOSE BLDC GLUCOMTR-MCNC: 94 MG/DL (ref 70–99)

## 2024-08-18 PROCEDURE — 71045 X-RAY EXAM CHEST 1 VIEW: CPT

## 2024-08-18 PROCEDURE — 97530 THERAPEUTIC ACTIVITIES: CPT | Mod: GO | Performed by: OCCUPATIONAL THERAPIST

## 2024-08-18 PROCEDURE — 258N000003 HC RX IP 258 OP 636: Performed by: PHYSICIAN ASSISTANT

## 2024-08-18 PROCEDURE — 250N000011 HC RX IP 250 OP 636: Performed by: PHYSICIAN ASSISTANT

## 2024-08-18 PROCEDURE — 71045 X-RAY EXAM CHEST 1 VIEW: CPT | Mod: 77

## 2024-08-18 PROCEDURE — 71045 X-RAY EXAM CHEST 1 VIEW: CPT | Mod: 26 | Performed by: RADIOLOGY

## 2024-08-18 PROCEDURE — 120N000002 HC R&B MED SURG/OB UMMC

## 2024-08-18 PROCEDURE — 250N000013 HC RX MED GY IP 250 OP 250 PS 637

## 2024-08-18 PROCEDURE — 250N000013 HC RX MED GY IP 250 OP 250 PS 637: Performed by: PHYSICIAN ASSISTANT

## 2024-08-18 RX ADMIN — POLYETHYLENE GLYCOL 3350 17 G: 17 POWDER, FOR SOLUTION ORAL at 08:32

## 2024-08-18 RX ADMIN — SENNOSIDES AND DOCUSATE SODIUM 1 TABLET: 8.6; 5 TABLET ORAL at 20:12

## 2024-08-18 RX ADMIN — ENOXAPARIN SODIUM 40 MG: 40 INJECTION SUBCUTANEOUS at 12:19

## 2024-08-18 RX ADMIN — GABAPENTIN 600 MG: 250 SUSPENSION ORAL at 22:24

## 2024-08-18 RX ADMIN — LEVOTHYROXINE SODIUM 75 MCG: 75 TABLET ORAL at 08:33

## 2024-08-18 RX ADMIN — ACETAMINOPHEN 650 MG: 325 SOLUTION ORAL at 17:23

## 2024-08-18 RX ADMIN — SODIUM CHLORIDE, POTASSIUM CHLORIDE, SODIUM LACTATE AND CALCIUM CHLORIDE: 600; 310; 30; 20 INJECTION, SOLUTION INTRAVENOUS at 00:03

## 2024-08-18 RX ADMIN — LIDOCAINE 1 PATCH: 4 PATCH TOPICAL at 08:47

## 2024-08-18 RX ADMIN — PANTOPRAZOLE SODIUM 40 MG: 40 TABLET, DELAYED RELEASE ORAL at 08:33

## 2024-08-18 RX ADMIN — Medication 7.5 MG: at 08:33

## 2024-08-18 RX ADMIN — ACETAMINOPHEN 650 MG: 325 SOLUTION ORAL at 20:16

## 2024-08-18 RX ADMIN — ACETAMINOPHEN 650 MG: 325 SOLUTION ORAL at 00:03

## 2024-08-18 RX ADMIN — LORATADINE 10 MG: 10 TABLET ORAL at 08:33

## 2024-08-18 RX ADMIN — ACETAMINOPHEN 650 MG: 325 SOLUTION ORAL at 05:16

## 2024-08-18 RX ADMIN — ACETAMINOPHEN 650 MG: 325 SOLUTION ORAL at 08:32

## 2024-08-18 RX ADMIN — BUSPIRONE HYDROCHLORIDE 15 MG: 15 TABLET ORAL at 08:33

## 2024-08-18 RX ADMIN — MEMANTINE 5 MG: 5 TABLET ORAL at 08:32

## 2024-08-18 RX ADMIN — BUPROPION HYDROCHLORIDE 150 MG: 150 TABLET, EXTENDED RELEASE ORAL at 08:33

## 2024-08-18 RX ADMIN — SENNOSIDES AND DOCUSATE SODIUM 1 TABLET: 8.6; 5 TABLET ORAL at 08:33

## 2024-08-18 RX ADMIN — CETIRIZINE HYDROCHLORIDE 10 MG: 10 TABLET, FILM COATED ORAL at 08:33

## 2024-08-18 ASSESSMENT — ACTIVITIES OF DAILY LIVING (ADL)
ADLS_ACUITY_SCORE: 34
ADLS_ACUITY_SCORE: 39
ADLS_ACUITY_SCORE: 35
ADLS_ACUITY_SCORE: 39
ADLS_ACUITY_SCORE: 39
ADLS_ACUITY_SCORE: 35
ADLS_ACUITY_SCORE: 39
ADLS_ACUITY_SCORE: 39
ADLS_ACUITY_SCORE: 34
ADLS_ACUITY_SCORE: 39
ADLS_ACUITY_SCORE: 34
ADLS_ACUITY_SCORE: 39
ADLS_ACUITY_SCORE: 33
ADLS_ACUITY_SCORE: 39
ADLS_ACUITY_SCORE: 39
ADLS_ACUITY_SCORE: 34
ADLS_ACUITY_SCORE: 39
ADLS_ACUITY_SCORE: 39
ADLS_ACUITY_SCORE: 35
ADLS_ACUITY_SCORE: 35
ADLS_ACUITY_SCORE: 39
ADLS_ACUITY_SCORE: 39
ADLS_ACUITY_SCORE: 34

## 2024-08-18 NOTE — PLAN OF CARE
Goal Outcome Evaluation:      Plan of Care Reviewed With: patient    Overall Patient Progress: improvingOverall Patient Progress: improving      Neuro: Pt. alert & Ox4, forgetful. Bed alarm on.  Behavior: Pt. calm & cooperative with cares.   Activity: Pt. up with 1 and walker.  Vital: AVSS on 2L/NC.  Bam 94  LDAs: PIV with LR at 100cc/hour. Right chest tube to water seal with 80cc dark red drainage.  Cardiac: Intermittent raquel.    Respiratory: LS coarse in right lobe.   GI/:  Pt. voiding with good output. No stools this shift. Pt. Not passing gas, +BS.  Skin: Surgical sites covered and dressings c/d/I.  Pain/Nausea: Abdominal pain controlled with sched. Tylenol. Pt. denies nausea.   Diet: NPO except ice chips and sips.  Labs/Imaging:  Morning labs pending.   Plan: Continue to follow POC and notify team with change in status.

## 2024-08-18 NOTE — PROGRESS NOTES
THORACIC & FOREGUT SURGERY    S: Doing well today. Denies pain. Swallowing better.     O:  Vitals:    08/17/24 1331 08/17/24 1404 08/17/24 1753 08/17/24 2146   BP: 111/65   (!) 145/70   BP Location: Left arm   Left arm   Patient Position:       Pulse: 62   59   Resp: 18   18   Temp: 97.8  F (36.6  C)   98.2  F (36.8  C)   TempSrc: Oral   Oral   SpO2: 90% 97% 92% 97%   Weight:       Height:           A&Ox3, NAD  Breathing non-labored on room air, Chest tube in place with minimal thin serosanguinous output, Tidaling but no air leak  RRR  Soft, NDNT  Distal extremities warm    Incisions covered with minimal output    BMP  Recent Labs   Lab 08/17/24  0713 08/17/24  0603 08/16/24  2323 08/16/24  1140 08/16/24  1128     --   --  140  --    POTASSIUM 4.6  --   --  4.5  --    CHLORIDE 106  --   --  106  --    ANDRES 8.2*  --   --  9.2  --    CO2 23  --   --  24  --    BUN 20.3  --   --  16.0  --    CR 1.21*  --  1.23* 1.16  --    * 120*  --  95 89     CBC  Recent Labs   Lab 08/17/24  0713   WBC 9.9   RBC 3.86*   HGB 12.0*   HCT 37.0*   MCV 96   MCH 31.1   MCHC 32.4   RDW 12.4   *     INR   No lab results found in last 7 days.     Most recent labs and images reviewed     A/P: Flaco Hickman is a 74 year old male with PMHx notable for ULISES, HLD, Depression, chronic urticaria, PTSD, hearing loss, hypothyroid who is now s/p robotic hiatal hernia repait on 8/16/24. Had some initial discomfort with swallowing and pain at the chest tube site, however now much improved.      Neuro:  S tylenol, S gabapentin, Lidocaine patch, PRN oxy/dilaudid for pain; PTA buproprion, buspar, lexapro, memantine  Resp: Wean O2 as tolerated, Frequent IS use, Remove chest tube today  CV: MARK  GI/FEN: CLD advance slowly, Continue LR, Patient reporting some difficulty with swallowing so will change medications to liquid as possible  Endo: PTA levothyroxine, PTA atarax and allergy medications  ID: MARK, Follow WBC  Heme: MARK, Follow  CBC, Maintain hgb > 7  Prophylaxis: qdaily Lovenox  Pathology: Pending  Dispo: 7C, Home in 1-2 days pending pain control and improved PO  POSTOP COMPLICATIONS: None  Today:  - Advance to CLD  - Remove chest tube with post-pull CXR  - Continue multimodal pain control and OOB activity    Patient seen and discussed with thoracic surgery fellow and staff    Gloria Shook MD    General Surgery, PGY-3

## 2024-08-18 NOTE — PROVIDER NOTIFICATION
Provider notified (name): Carlyle Klein, 1st call Thoracic on [facility]  Reason for notification: Pt tolerating clear liquids well now. Stopping LR fluids.    Response from provider: Stopped in MAR

## 2024-08-18 NOTE — OP NOTE
Preoperative diagnosis:                         Hiatal hernia  Postoperative diagnosis:                       Hiatal hernia    Procedure:   Robotic-assisted laparoscopic repair of hiatal hernia  Gastropexy  Right chest tube  EGD    Anesthesia: General   Surgeon: Darren Davidson   Assistant surgeon: Consuelo Feliciano MD; Paulie Esquivel PA-C  EBL:  Less than 30 mL    Complications: none immediate  Findings:  hiatal hernia, reduced     Description of procedure   The patient was brought to the room and placed supine upon the table.  After confirming the patient's identity and signed the consent, appropriate monitoring devices were placed as well as SCD boots.  General anesthesia was administered and the patient was intubated with a single-lumen endotracheal tube. Intravenous antibiobic was administered within one hour prior to the incision.  The patient was secured to the table while remaining supine and a foot board was placed.  The abdomen was prepped with chlorhexidine and draped in standard surgical fashion.      Pneumoperitoneum was established to 15 mm Hg using a Veress needle.  An 8 mm trocar was placed two fingerbreadths to the right of the umbilicus. An 8 mm 30-degree laparoscope was inserted into the peritoneal cavity.  An 8 mm 30-degree laparoscope was inserted into the peritoneal cavity.  An 8 mm port was placed a hand's breadth to the left and slightly cephalad.  Another 8 mm port was placed in the left anterior axillary line in the subcostal region and another 8mm port was placed lateral to that. A port was placed subcostal on the right and a fast clamp retractor was used to retract the liver to expose the hiatus.  Finally, a 12 mm Air seal port was placed in the right lower quadrant. The hiatus was widely patent and the hernia sac was reduced into the abdominal cavity and the plane between the sac and the mediastinum was located and mediastinal dissection continued up into the chest, freeing the sac and  the esophagus circumferentially.  Care was taken to minimize injury to the pleura.  The vagus nerves were identified and preserved during the course of dissection.  The left and right crurae were dissected free as well.  The dissection continued posteriorly, freeing up the posterior portion of the sac within the mediastinum.  The short gastrics were divided.  Endoscopy confirmed the GE junction was appropriately located 2-3 cm below the hiatus.  The stomach lay within the abdominal cavity under no tension.  Two posterior sutures were placed to close the hiatus.  A 54 K[place] bougie was passed into the stomach by the anesthesia team.  Four anterior stitches were placed at the hiatus.Next, we performed a gastropexy at three separate places along the line of the short gastrics. A chest tube was placed into the  right pleural cavity. The peritoneal cavity was inspected for hemostasis, which was noted.  The liver retractor was removed. A nasogastric tube had been inserted to decompress the stomach.    The ports were removed under direct vision and the pneumoperitoneum was allowed to escape.  The incisions were irrigated and closed in layers with 3-0 Vicryl and 4-0 Monocryl.  The area was  cleaned and dried and exofin was applied.  At the end of the case, the sponge, needle and instrument counts were correct. There were no qualified residents available, so AUBREY Esquivel acted as my bedside assistant  for the entire case.

## 2024-08-19 ENCOUNTER — APPOINTMENT (OUTPATIENT)
Dept: OCCUPATIONAL THERAPY | Facility: CLINIC | Age: 74
DRG: 328 | End: 2024-08-19
Attending: THORACIC SURGERY (CARDIOTHORACIC VASCULAR SURGERY)
Payer: COMMERCIAL

## 2024-08-19 ENCOUNTER — APPOINTMENT (OUTPATIENT)
Dept: GENERAL RADIOLOGY | Facility: CLINIC | Age: 74
DRG: 328 | End: 2024-08-19
Attending: PHYSICIAN ASSISTANT
Payer: COMMERCIAL

## 2024-08-19 VITALS
BODY MASS INDEX: 26.27 KG/M2 | TEMPERATURE: 98.3 F | SYSTOLIC BLOOD PRESSURE: 133 MMHG | WEIGHT: 167.4 LBS | HEART RATE: 57 BPM | RESPIRATION RATE: 16 BRPM | HEIGHT: 67 IN | DIASTOLIC BLOOD PRESSURE: 73 MMHG | OXYGEN SATURATION: 96 %

## 2024-08-19 LAB
ANION GAP SERPL CALCULATED.3IONS-SCNC: 9 MMOL/L (ref 7–15)
BUN SERPL-MCNC: 12.5 MG/DL (ref 8–23)
CALCIUM SERPL-MCNC: 8.8 MG/DL (ref 8.8–10.4)
CHLORIDE SERPL-SCNC: 105 MMOL/L (ref 98–107)
CREAT SERPL-MCNC: 1.17 MG/DL (ref 0.67–1.17)
EGFRCR SERPLBLD CKD-EPI 2021: 65 ML/MIN/1.73M2
ERYTHROCYTE [DISTWIDTH] IN BLOOD BY AUTOMATED COUNT: 12.2 % (ref 10–15)
GLUCOSE SERPL-MCNC: 90 MG/DL (ref 70–99)
HCO3 SERPL-SCNC: 28 MMOL/L (ref 22–29)
HCT VFR BLD AUTO: 35.9 % (ref 40–53)
HGB BLD-MCNC: 11.7 G/DL (ref 13.3–17.7)
MCH RBC QN AUTO: 31 PG (ref 26.5–33)
MCHC RBC AUTO-ENTMCNC: 32.6 G/DL (ref 31.5–36.5)
MCV RBC AUTO: 95 FL (ref 78–100)
PLATELET # BLD AUTO: 105 10E3/UL (ref 150–450)
POTASSIUM SERPL-SCNC: 3.8 MMOL/L (ref 3.4–5.3)
RBC # BLD AUTO: 3.77 10E6/UL (ref 4.4–5.9)
SODIUM SERPL-SCNC: 142 MMOL/L (ref 135–145)
WBC # BLD AUTO: 6.4 10E3/UL (ref 4–11)

## 2024-08-19 PROCEDURE — 85027 COMPLETE CBC AUTOMATED: CPT

## 2024-08-19 PROCEDURE — 250N000013 HC RX MED GY IP 250 OP 250 PS 637

## 2024-08-19 PROCEDURE — 80048 BASIC METABOLIC PNL TOTAL CA: CPT

## 2024-08-19 PROCEDURE — 71045 X-RAY EXAM CHEST 1 VIEW: CPT | Mod: 26 | Performed by: RADIOLOGY

## 2024-08-19 PROCEDURE — 97535 SELF CARE MNGMENT TRAINING: CPT | Mod: GO

## 2024-08-19 PROCEDURE — 250N000009 HC RX 250: Performed by: PHYSICIAN ASSISTANT

## 2024-08-19 PROCEDURE — 250N000013 HC RX MED GY IP 250 OP 250 PS 637: Performed by: PHYSICIAN ASSISTANT

## 2024-08-19 PROCEDURE — 36415 COLL VENOUS BLD VENIPUNCTURE: CPT

## 2024-08-19 PROCEDURE — 71045 X-RAY EXAM CHEST 1 VIEW: CPT

## 2024-08-19 RX ORDER — METHOCARBAMOL 500 MG/1
500 TABLET, FILM COATED ORAL EVERY 6 HOURS PRN
Qty: 20 TABLET | Refills: 0 | Status: SHIPPED | OUTPATIENT
Start: 2024-08-19 | End: 2024-09-09

## 2024-08-19 RX ORDER — OXYCODONE HYDROCHLORIDE 5 MG/1
5 TABLET ORAL EVERY 4 HOURS PRN
Qty: 40 TABLET | Refills: 0 | Status: SHIPPED | OUTPATIENT
Start: 2024-08-19 | End: 2024-09-09

## 2024-08-19 RX ORDER — AMOXICILLIN 250 MG
2 CAPSULE ORAL 2 TIMES DAILY
Qty: 40 TABLET | Refills: 0 | Status: SHIPPED | OUTPATIENT
Start: 2024-08-19 | End: 2024-09-09

## 2024-08-19 RX ORDER — LIDOCAINE 4 G/G
1 PATCH TOPICAL EVERY 24 HOURS
COMMUNITY
Start: 2024-08-19 | End: 2024-09-09

## 2024-08-19 RX ORDER — ONDANSETRON 4 MG/1
4 TABLET, ORALLY DISINTEGRATING ORAL EVERY 6 HOURS PRN
Qty: 10 TABLET | Refills: 0 | Status: SHIPPED | OUTPATIENT
Start: 2024-08-19 | End: 2024-09-09

## 2024-08-19 RX ADMIN — PANTOPRAZOLE SODIUM 40 MG: 40 INJECTION, POWDER, FOR SOLUTION INTRAVENOUS at 08:19

## 2024-08-19 RX ADMIN — Medication 7.5 MG: at 09:57

## 2024-08-19 RX ADMIN — LIDOCAINE 1 PATCH: 4 PATCH TOPICAL at 09:56

## 2024-08-19 RX ADMIN — BUSPIRONE HYDROCHLORIDE 15 MG: 15 TABLET ORAL at 09:56

## 2024-08-19 RX ADMIN — LORATADINE 10 MG: 10 TABLET ORAL at 09:56

## 2024-08-19 RX ADMIN — POLYETHYLENE GLYCOL 3350 17 G: 17 POWDER, FOR SOLUTION ORAL at 09:56

## 2024-08-19 RX ADMIN — MEMANTINE 5 MG: 5 TABLET ORAL at 09:56

## 2024-08-19 RX ADMIN — CETIRIZINE HYDROCHLORIDE 10 MG: 10 TABLET, FILM COATED ORAL at 09:56

## 2024-08-19 RX ADMIN — BUPROPION HYDROCHLORIDE 150 MG: 150 TABLET, EXTENDED RELEASE ORAL at 09:56

## 2024-08-19 RX ADMIN — ACETAMINOPHEN 650 MG: 325 SOLUTION ORAL at 09:56

## 2024-08-19 RX ADMIN — SENNOSIDES AND DOCUSATE SODIUM 1 TABLET: 8.6; 5 TABLET ORAL at 09:56

## 2024-08-19 RX ADMIN — LEVOTHYROXINE SODIUM 75 MCG: 75 TABLET ORAL at 08:19

## 2024-08-19 ASSESSMENT — ACTIVITIES OF DAILY LIVING (ADL)
ADLS_ACUITY_SCORE: 33

## 2024-08-19 NOTE — PLAN OF CARE
"Cares 0700 to 1900    /72 (BP Location: Right arm)   Pulse 56   Temp 97.9  F (36.6  C) (Oral)   Resp 18   Ht 1.702 m (5' 7\")   Wt 75.9 kg (167 lb 6.4 oz)   SpO2 92%   BMI 26.22 kg/m      VSS on room air during daytime. Pt wears 2L O2 nasal cannula overnight. Calls appropriately. Pt reported feeling better today. Likely to discharge tmrw.     Goal Outcome Evaluation:    Plan of Care Reviewed With: patient, spouse  Overall Patient Progress: improvingOverall Patient Progress: improving    Outcome Evaluation: Denies pain. Ambulated in [place] 4x with wife, up ad fermin. Chest tube removed today, denies SOB. Using incentive spirometer every hour, tolerating it well. Started on clear liquid diet, swallowing fluids well. Denied nausea/abdominal discomfort. No BM, but + BS & passing gas.      "

## 2024-08-19 NOTE — PLAN OF CARE
Goal Outcome Evaluation:    RA during day. 2 LPM NC while sleeping (baseline).  [place]. Hearing aids on.  Lap sites dressings CDI.  Old CT site dressing CDI.  BM x2 this shift. Soft/loose and brown.  Tolerates full liquid diet.  PIVs SL.  Denies pain, nausea.  Up independently in room. Ambulated with family in hallway, around both units (7C 400 and 500 side).    Discharging this late afternoon/early evening. Waiting on MD to place discharge orders.

## 2024-08-19 NOTE — DISCHARGE SUMMARY
NAME: Flaco Hickman   MRN: [id number]   : 1950     DATE OF ADMISSION: 2024     PRE/POSTOPERATIVE DIAGNOSES: Hiatal hernia    Procedure:   Robotic-assisted laparoscopic repair of hiatal hernia  Gastropexy  Right chest tube  EGD    CULTURE RESULTS: None     INTRAOPERATIVE COMPLICATIONS: None     POSTOPERATIVE MEDICAL ISSUES: None     DRAINS/TUBES PRESENT AT DISCHARGE: None    DATE OF DISCHARGE:  2024     HOSPITAL COURSE: Flaco Hickman is a 74 year old male who on 2024 underwent the above-named procedures.  He tolerated the operation well and postoperatively was transferred to the general post-surgical unit.  The remainder of his course was essentially uncomplicated.  Prior to discharge, his pain was controlled well, he was able to perform ADLs and ambulate independently without difficulty, and had full return of bowel and bladder function.  On 2024, he was discharged to home in stable condition with no tubes or drains in place.    DISCHARGE EXAM:   A&O, NAD  Resp non-labored  Distal extremities warm    Incisions healthy appearing    DISCHARGE INSTRUCTIONS:  Discharge Procedure Orders   Reason for your hospital stay   Order Comments: Surgery     Activity   Order Comments: See below     Order Specific Question Answer Comments   Is discharge order? Yes      Follow Up and recommended labs and tests   Order Comments:   XR ESOPHOGRAM W UPPER GI  2:00 PM  (45 min.)  [id number]  [facility]  [facility] [place]    RETURN CCSL  2:45 PM  (30 min.)  Shannan Francis APRN CNS  [facility]  Cancer Clinic     Discharge Instructions   Order Comments: THORACIC SURGERY DISCHARGE INSTRUCTIONS    DIET: Full diet at time of discharge.  Advance as tolerated starting tomorrow.    Take stool softeners as prescribed at discharge (Miralax, docusate, and/or senna).  CALL THE THORACIC SURGERY TEAM IF YOU HAVE NOT HAD A BOWEL MOVEMENT BY 48HRS  "AFTER DISCHARGE.     If your plans upon discharge include prolonged periods of sitting (i.e a lengthy car or plane ride), it is highly beneficial to get up and walk at least once per hour to help prevent swelling and blood clots.     You may remove chest tube dressing 48 hours after tube removal and bandage the site at your own discretion thereafter.  Small amounts of leakage are normal for 2-3 days after removal.  Feel free to call with questions.    You may get incision wet 2 days after operation. Do not submerge, soak, or scrub incision or swim until seen in follow-up.    Take incentive spirometer home for continued frequent use    Activity as tolerated, no strenous activity until seen in follow-up    No lifting greater than 20 pounds for the first 2 weeks, then no more than 40lbs for the next 4 weeks.    Call for fever greater than 101.5, chills, increased size of incision, red skin around incision, vision changes, muscle strength changes, sensation changes, shortness of breath, or other concerns.    No driving while taking narcotic pain medication.    Transition to ibuprofen or tylenol/acetaminophen for pain control. Do not take tylenol/acetaminophen and acetaminophen containing narcotic (e.g., percocet or vicodin) at the same time. If you have known ulcer problems, or kidney trouble (elevated creatinine) do not take the ibuprofen.    In emergencies, call 911    For other Questions or Concerns;   A.) During weekday working hours (Monday through Friday 8am to 4:30pm)   call [phone] and ask to speak to a clinical nurse specialist.     B.) At nights (after 4:30pm), on weekends, or if urgent call [phone] and   tell the  \"I would like to page job code [phone], the thoracic surgery   fellow on call, please.\"     Diet   Order Comments: See below     Order Specific Question Answer Comments   Is discharge order? Yes        DISCHARGE MEDICATIONS:   Current Discharge Medication List        START " taking these medications    Details   Lidocaine (LIDOCARE) 4 % Patch Place 1 patch onto the skin every 24 hours To prevent lidocaine toxicity, patient should be patch free for 12 hrs daily.    Associated Diagnoses: Gastroesophageal reflux disease with esophagitis, unspecified whether hemorrhage      methocarbamol (ROBAXIN) 500 MG tablet Take 1 tablet (500 mg) by mouth every 6 hours as needed for muscle spasms  Qty: 20 tablet, Refills: 0    Associated Diagnoses: Gastroesophageal reflux disease with esophagitis, unspecified whether hemorrhage      ondansetron (ZOFRAN ODT) 4 MG ODT tab Take 1 tablet (4 mg) by mouth every 6 hours as needed for nausea or vomiting Call the thoracic surgery office if you become nauseated after discharge.  Qty: 10 tablet, Refills: 0    Associated Diagnoses: Gastroesophageal reflux disease with esophagitis, unspecified whether hemorrhage      oxyCODONE (ROXICODONE) 5 MG tablet Take 1 tablet (5 mg) by mouth every 4 hours as needed for moderate pain Wean narcotic use as tolerated starting at time of discharge  Qty: 40 tablet, Refills: 0    Associated Diagnoses: Gastroesophageal reflux disease with esophagitis, unspecified whether hemorrhage      senna-docusate (SENOKOT-S/PERICOLACE) 8.6-50 MG tablet Take 2 tablets by mouth 2 times daily Reduce dose or temporarily discontinue if having loose stools.  Qty: 40 tablet, Refills: 0    Associated Diagnoses: Gastroesophageal reflux disease with esophagitis, unspecified whether hemorrhage           CONTINUE these medications which have NOT CHANGED    Details   acetaminophen (TYLENOL) 325 MG tablet Take 325-650 mg by mouth      albuterol (PROAIR HFA/PROVENTIL HFA/VENTOLIN HFA) 108 (90 Base) MCG/ACT inhaler Inhale 2 puffs into the lungs 4 times daily      ascorbic acid (VITAMIN C) 250 MG CHEW chewable tablet Take 250 mg by mouth every morning      atorvastatin (LIPITOR) 80 MG tablet Take 40 mg by mouth every evening      buPROPion (ZYBAN) 150 MG 12 hr  tablet Take 150 mg by mouth every morning      busPIRone (BUSPAR) 15 MG tablet Take 15 mg by mouth 2 times daily      cetirizine (ZYRTEC) 10 MG tablet Take 10 mg by mouth every morning      cholecalciferol (VITAMIN D3) 25 mcg (1000 units) capsule Take 1 capsule by mouth every morning      escitalopram (LEXAPRO) 5 MG tablet Take 7.5 mg by mouth every morning      ferrous sulfate (FEROSUL) 325 (65 Fe) MG tablet Take 325 mg by mouth daily (with breakfast)      gabapentin (NEURONTIN) 300 MG capsule Take 600 mg by mouth at bedtime      hypromellose-dextran (ARTIFICAL TEARS) 0.1-0.3 % ophthalmic solution Apply 1 drop to eye      levothyroxine (SYNTHROID/LEVOTHROID) 75 MCG tablet Take 75 mcg by mouth every morning      loratadine (CLARITIN) 10 MG tablet Take 10 mg by mouth every morning      memantine (NAMENDA) 5 MG tablet Take 5 mg by mouth every morning      omeprazole (PRILOSEC) 20 MG DR capsule Take 20 mg by mouth every morning      predniSONE (DELTASONE) 20 MG tablet Take 20 mg by mouth daily as needed (idiopathic urticaria flare)      vitamin B complex with vitamin C (VITAMIN  B COMPLEX) tablet Take 1 tablet by mouth every morning

## 2024-08-19 NOTE — PROVIDER NOTIFICATION
Patient eager to discharge to home. Daughter is at bedside. She is an RN. Writer reviewed discharge instructions with the daughter. She [facility] and he is only taking tylenol so they will not be needing the prescriptions filled at the discharge pharmacy today. He has a 2 hour drive to home tonight so instructed to get out and move around every hour per the discharge instructions. 2 PIV's removed. Pt left via wheelchair with daughter at 1620.

## 2024-08-19 NOTE — PLAN OF CARE
Goal Outcome Evaluation:      Plan of Care Reviewed With: patient    Overall Patient Progress: improving Overall Patient Progress: improving     Pain: rated as a 2 on a scale of 1-10; pt denies pain at 1am, so he refused 1am Tylenol.  Neuro: A&O x4  Respiratory: crackles heard, pt denies SOB  Cardiac/Neurovascular: intermittently raquel  GI/: no BM since 8-15 but bowel sounds heard in all quadrants. Voids spontaneously via urinal. 5 lap sites noted from hernia repair on 8-17-24. Chest tube removed from R torse, UTV site.   Nutrition: clear liquid diet  Activity: independent  Skin: intact. Facial flushing, tanned back, pale legs  Lines: 2 PIV saline locked, pt denied of any pain or burning when flushed.  Events this shift: pt was able to sleep through the night, wakes up to gentle touch. Offer to crush oral meds and denied pain meds from 1am-5am. Pt's HR was 57 for 5am vitals. Paged thoracic surgery provider, Emily Avalos.

## 2024-08-19 NOTE — PROGRESS NOTES
THORACIC & FOREGUT SURGERY    S: Doing well today. Denies pain.      O:  Vitals:    08/18/24 1103 08/18/24 1314 08/18/24 2133 08/19/24 0506   BP:  125/72 131/65 133/73   BP Location:  Right arm Right arm Right arm   Patient Position:    Supine   Pulse:  56 60 57   Resp:  18 18 16   Temp:  97.9  F (36.6  C) 99  F (37.2  C) 98.3  F (36.8  C)   TempSrc:  Oral Oral Oral   SpO2:  92% 92% 96%   Weight: 75.9 kg (167 lb 6.4 oz)      Height:           A&Ox3, NAD  Breathing non-labored on room air   Appears well-perfused  Soft, NDNT  Distal extremities warm    Incisions covered with minimal output    BMP  Recent Labs   Lab 08/19/24  0640 08/18/24  0620 08/17/24  0713 08/17/24  0603 08/16/24  2323 08/16/24  1140     --  140  --   --  140   POTASSIUM 3.8  --  4.6  --   --  4.5   CHLORIDE 105  --  106  --   --  106   ANDRES 8.8  --  8.2*  --   --  9.2   CO2 28  --  23  --   --  24   BUN 12.5  --  20.3  --   --  16.0   CR 1.17  --  1.21*  --  1.23* 1.16   GLC 90 94 117* 120*  --  95     CBC  Recent Labs   Lab 08/19/24  0639 08/17/24  0713   WBC 6.4 9.9   RBC 3.77* 3.86*   HGB 11.7* 12.0*   HCT 35.9* 37.0*   MCV 95 96   MCH 31.0 31.1   MCHC 32.6 32.4   RDW 12.2 12.4   * 119*     INR   No lab results found in last 7 days.     Most recent labs and images reviewed     A/P: Flaco Hickman is a 74 year old male with PMHx notable for ULISES, HLD, Depression, chronic urticaria, PTSD, hearing loss, hypothyroid who is now s/p robotic hiatal hernia repait on 8/16/24. Had some initial discomfort with swallowing and pain at the chest tube site, however now much improved.      -Full liquid diet  -Aggressive bowel regimen  Dispo: 7C, Home this p.m. versus tomorrow, dependent on return of bowel function  POSTOP COMPLICATIONS: None  Seen and discussed with staff    Rafael Thomas PA-C

## 2024-08-19 NOTE — DISCHARGE SUMMARY
NAME: Flaco Hickman   MRN: [id number]   : 1950     DATE OF ADMISSION: 2024     PRE/POSTOPERATIVE DIAGNOSES: Hiatal hernia     PROCEDURES PERFORMED:   Robotic-assisted laparoscopic repair of hiatal hernia  Gastropexy  Right chest tube  EGD    PATHOLOGY RESULTS: hernia sac and lymph nodes pending as of      CULTURE RESULTS: None     INTRAOPERATIVE COMPLICATIONS: None     POSTOPERATIVE COMPLICATIONS: None     DRAINS/TUBES PRESENT AT DISCHARGE: dressing changes    DATE OF DISCHARGE:  2024     HOSPITAL COURSE: Flaco Hickman is a 74 year old male who on 2024 underwent the above-named procedures.  He tolerated the operation well and postoperatively was transferred to the general post-surgical unit.  The remainder of his course was essentially uncomplicated.  Prior to discharge, his pain was controlled well, he was able to perform ADLs and ambulate independently without difficulty, and had full return of bladder function and was passing gas.  On 2024, he was discharged to home in stable condition.    DISCHARGE INSTRUCTIONS:  No discharge procedures on file.    DISCHARGE MEDICATIONS:      Medication List        Started      Lidocaine 4 % Patch  Commonly known as: LIDOCARE  1 patch, Transdermal, EVERY 24 HOURS, To prevent lidocaine toxicity, patient should be patch free for 12 hrs daily.     methocarbamol 500 MG tablet  Commonly known as: ROBAXIN  500 mg, Oral, EVERY 6 HOURS PRN     ondansetron 4 MG ODT tab  Commonly known as: ZOFRAN ODT  4 mg, Oral, EVERY 6 HOURS PRN, Call the thoracic surgery office if you become nauseated after discharge.     oxyCODONE 5 MG tablet  Commonly known as: ROXICODONE  5 mg, Oral, EVERY 4 HOURS PRN, Wean narcotic use as tolerated starting at time of discharge     senna-docusate 8.6-50 MG tablet  Commonly known as: SENOKOT-S/PERICOLACE  2 tablets, Oral, 2 TIMES DAILY, Reduce dose or temporarily discontinue if having loose stools.

## 2024-08-19 NOTE — PROGRESS NOTES
THORACIC SURGERY PROGRESS NOTE    Procedure: Robot-assisted Laparoscopic Hiatal Hernia Repair, Esophagogastroscopy, Gastropexy, Right Chest Tube      S: Pt denies pain, SOB, nausea or emesis. Voiding and passing gas, but no BM yet. Tolerated clear liquids diet. Ambulating around the unit.      O:  /73 / Temp 98.3  F(36.8  C) / Pulse 57 / Resp 16 / SpO2 96% / Weight 75.9 kg(167 lb)        Gen: A&O x4   Resp: non labored breathing on room air  CV: RRR  Abd: soft, minimally tender, non-distended   Ext: warm and well perfused, no edema, SCDs on     A/P: Flaco Hickman is a 74 year old male with PMHx notable for ULISES, HLD, Depression, chronic urticaria, PTSD, hearing loss, hypothyroid who is now s/p robotic hiatal hernia repait on 8/16/24 (POD#3). Had some initial discomfort with swallowing and pain at the chest tube site, however now much improved.     -Advance diet to full liquids  -Encourage ambulating      Pain/neuro: Pain controlled with current regimen   CV: RRR  Resp: Breathing non-labored in room air  GI: Advance diet, no BM yet. Not felling bloated or constipated.  PPx: Lovenox  Dispo: Remain inpatient, possible discharge if tolerate well new diet and BM     Seen with fellow and resident, will discuss with staff.    Napoleon Mishra, MS3  International Student  Thoracic Surgery

## 2024-08-20 NOTE — PLAN OF CARE
Occupational Therapy Discharge Summary    Reason for therapy discharge:    Discharged to home.    Progress towards therapy goal(s). See goals on Care Plan in [facility] electronic health record for goal details.  Goals partially met.  Barriers to achieving goals:   discharge from facility.    Therapy recommendation(s):    No further therapy is recommended.

## 2024-08-21 LAB
PATH REPORT.COMMENTS IMP SPEC: NORMAL
PATH REPORT.COMMENTS IMP SPEC: NORMAL
PATH REPORT.FINAL DX SPEC: NORMAL
PATH REPORT.GROSS SPEC: NORMAL
PATH REPORT.MICROSCOPIC SPEC OTHER STN: NORMAL
PATH REPORT.RELEVANT HX SPEC: NORMAL
PHOTO IMAGE: NORMAL

## 2024-08-22 ENCOUNTER — PATIENT OUTREACH (OUTPATIENT)
Dept: SURGERY | Facility: CLINIC | Age: 74
End: 2024-08-22
Payer: MEDICARE

## 2024-08-22 NOTE — PROGRESS NOTES
Post Op Discharge Call    Surgery: Robotic-assisted laparoscopic repair of hiatal hernia, gastropexy, right chest tube, EGD    Surgery date: 8/16/24    Discharge Date:  8/19/24    Immediate Concerns: None at this time.     Pain:  No concerns with pain management.   Using pain medications as recommended with appropriate relief.     Incision:   No concerns, healing well, no redness, drainage or edema reported.     Drains:   No drain.     Diet: patient stated he has been able to eat basically whatever he wants. He mentioned chicken noodle soup and corn. Advised he stay away from corn and hard to digest foods. He should eat softer foods like eggs, oatmeal, ground beef or chicken with gravy or sauce.      Bowels:   Passing gas.   Patient reports he has had bowel movement post op.     Activity:   No difficulty with ADLs reported.   Patient is up independently at home.     Post op/follow up plans:   Post op appointment scheduled,confirmed date and time with patient.       Future Appointments   Date Time Provider Department Center   9[facility]9/2024  [facility]   9/9/2024  3:00 PM Shannan Francis APRN CNS [facility]         Patient has our direct number for any questions or concerns that may arise.      ESAU Forman  Thoracic Surgery

## 2024-08-22 NOTE — RESULT ENCOUNTER NOTE
I have personally reviewed the pathology results which support a diagnosis of benign lymph node.  Sites involved in this diagnosis include: lymph node.      Darren Davidson MD

## 2024-09-06 NOTE — PROGRESS NOTES
THORACIC SURGERY FOLLOW UP VISIT      I saw Mr. Flaco Hickman in follow-up today. The clinical summary follows:     PREOP DIAGNOSIS   Hiatal hernia  PROCEDURE   Robot assisted laparoscopic hiatal hernia repair, gastropexy and chest tube placement  DATE OF PROCEDURE  08/16/2024    COMPLICATIONS  None    INTERVAL STUDIES  Esophagram 09/09/2024:  1. Status post hiatal hernia repair without evidence of recurrent hiatal hernia or other postoperative complication.  2. Suspected gastroesophageal reflux into the mid to distal esophagus.    Past Medical History:   Diagnosis Date    Allergies     Dependence on nocturnal oxygen therapy     Depression     Hiatal hernia with gastroesophageal reflux     Hypercholesteremia     Hypothyroidism     Idiopathic angio-edema-urticaria     Migraine     Nerve pain     right hand    SNHL (sensorineural hearing loss)     Stopped smoking with 50 pack year history       Past Surgical History:   Procedure Laterality Date    BLEPHAROPLASTY, BROW LIFT, COMBINED Bilateral 11/15/2021    Procedure: Both upper eyelid blepharoplasty and direct browplasty;  Surgeon: Desirae Sanchez MD;  Location: [facility] OR    CHEILECTOMY      Toe    DENTAL SURGERY      HERNIORRHAPHY, HIATAL, ROBOT ASSISTED N/A 8/16/2024    Procedure: Robot-assisted Laparoscopic Hiatal Hernia Repair, Esophagogastroscopy, Gastropexy, Right Chest Tube;  Surgeon: Darren Davidson MD;  Location: [facility] OR    L5 laminectomy      right total knee Right       Social History     Socioeconomic History    Marital status:      Spouse name: Not on file    Number of children: Not on file    Years of education: Not on file    Highest education level: Not on file   Occupational History    Not on file   Tobacco Use    Smoking status: Former     Types: Cigarettes    Smokeless tobacco: Never   Substance and Sexual Activity    Alcohol use: Not Currently    Drug use: Never    Sexual activity: Not on file   Other Topics Concern    Not on file    Social History Narrative    Not on file     Social Determinants of Health     Financial Resource Strain: Not on file   Food Insecurity: Not on file   Transportation Needs: Not on file   Physical Activity: Not on file   Stress: Not on file   Social Connections: Not on file   Interpersonal Safety: Not At Risk (1/1/2024)    Received from [facility] and [facility]    Intimate Partner Violence     Physically hurt, threatened and/or made to feel afraid: No   Housing Stability: Not on file      SUN Jain is doing good. He does not have any troubles eating anymore. The food goes down, does not get stuck and he does not vomit all over after he eats. He does have some reflux and is still on the omeprazole. He denies pain or shortness of breath.     OBJECTIVE  /83 (BP Location: Right arm, Patient Position: Sitting, Cuff Size: Adult Regular)   Pulse 56   Temp 97.9  F (36.6  C) (Oral)   Resp 16   Wt 73.5 kg (162 lb 1.6 oz)   SpO2 96%   BMI 25.39 kg/m       His incisions are healed nicely.    From a personal perspective, he is excited to be able to meet up with his friends for lunch this Thursday. Prior to surgery to fix the hernia, he did not enjoy going to these lunches because he would throw up after eating.    IMPRESSION   Cristobal is a 74 year-old male status post robot assisted laparoscopic hiatal hernia repair, gastropexy and chest tube placement. He is here for post operative follow up.      PLAN  I spent 25 min on the date of the encounter in chart review, patient visit, review of tests, documentation and/or discussion with other providers about the issues documented above. I reviewed the plan as follows:  Follow up with me in 1 year with an esophagram prior. Continue taking omeprazole.  Necessary Tests & Appointments: esophagram  All questions were answered and the patient and present family were in agreement with the plan.  I appreciate the opportunity to participate in the care of your  patient and will keep you updated.  Sincerely,

## 2024-09-09 ENCOUNTER — ANCILLARY PROCEDURE (OUTPATIENT)
Dept: GENERAL RADIOLOGY | Facility: CLINIC | Age: 74
End: 2024-09-09
Attending: THORACIC SURGERY (CARDIOTHORACIC VASCULAR SURGERY)
Payer: COMMERCIAL

## 2024-09-09 ENCOUNTER — ONCOLOGY VISIT (OUTPATIENT)
Dept: SURGERY | Facility: CLINIC | Age: 74
End: 2024-09-09
Attending: THORACIC SURGERY (CARDIOTHORACIC VASCULAR SURGERY)
Payer: MEDICARE

## 2024-09-09 VITALS
RESPIRATION RATE: 16 BRPM | TEMPERATURE: 97.9 F | HEART RATE: 56 BPM | OXYGEN SATURATION: 96 % | DIASTOLIC BLOOD PRESSURE: 83 MMHG | SYSTOLIC BLOOD PRESSURE: 127 MMHG | BODY MASS INDEX: 25.39 KG/M2 | WEIGHT: 162.1 LBS

## 2024-09-09 DIAGNOSIS — K44.9 HIATAL HERNIA: Primary | ICD-10-CM

## 2024-09-09 DIAGNOSIS — K44.9 HIATAL HERNIA WITH GASTROESOPHAGEAL REFLUX: ICD-10-CM

## 2024-09-09 DIAGNOSIS — K21.9 HIATAL HERNIA WITH GASTROESOPHAGEAL REFLUX: ICD-10-CM

## 2024-09-09 PROCEDURE — 99024 POSTOP FOLLOW-UP VISIT: CPT | Performed by: CLINICAL NURSE SPECIALIST

## 2024-09-09 PROCEDURE — G0463 HOSPITAL OUTPT CLINIC VISIT: HCPCS | Performed by: CLINICAL NURSE SPECIALIST

## 2024-09-09 PROCEDURE — 74246 X-RAY XM UPR GI TRC 2CNTRST: CPT | Mod: GC | Performed by: RADIOLOGY

## 2024-09-09 ASSESSMENT — PAIN SCALES - GENERAL: PAINLEVEL: NO PAIN (0)

## 2024-09-09 NOTE — LETTER
9/9/2024      Flaco Hickman  [address]  [place] [address]      Dear Colleague,    Thank you for referring your patient, Flaco Hickman, to the [facility]. Please see a copy of my visit note below.    THORACIC SURGERY FOLLOW UP VISIT      I saw Mr. Flaco Hickman in follow-up today. The clinical summary follows:     PREOP DIAGNOSIS   Hiatal hernia  PROCEDURE   Robot assisted laparoscopic hiatal hernia repair, gastropexy and chest tube placement  DATE OF PROCEDURE  08/16/2024    COMPLICATIONS  None    INTERVAL STUDIES  Esophagram 09/09/2024:  1. Status post hiatal hernia repair without evidence of recurrent hiatal hernia or other postoperative complication.  2. Suspected gastroesophageal reflux into the mid to distal esophagus.    Past Medical History:   Diagnosis Date     Allergies      Dependence on nocturnal oxygen therapy      Depression      Hiatal hernia with gastroesophageal reflux      Hypercholesteremia      Hypothyroidism      Idiopathic angio-edema-urticaria      Migraine      Nerve pain     right hand     SNHL (sensorineural hearing loss)      Stopped smoking with 50 pack year history       Past Surgical History:   Procedure Laterality Date     BLEPHAROPLASTY, BROW LIFT, COMBINED Bilateral 11/15/2021    Procedure: Both upper eyelid blepharoplasty and direct browplasty;  Surgeon: Desirae Sanchez MD;  Location: [facility] OR     CHEILECTOMY      Toe     DENTAL SURGERY       HERNIORRHAPHY, HIATAL, ROBOT ASSISTED N/A 8/16/2024    Procedure: Robot-assisted Laparoscopic Hiatal Hernia Repair, Esophagogastroscopy, Gastropexy, Right Chest Tube;  Surgeon: Darren Davidson MD;  Location: [facility] OR     L5 laminectomy       right total knee Right       Social History     Socioeconomic History     Marital status:      Spouse name: Not on file     Number of children: Not on file     Years of education: Not on file     Highest education level: Not on file    Occupational History     Not on file   Tobacco Use     Smoking status: Former     Types: Cigarettes     Smokeless tobacco: Never   Substance and Sexual Activity     Alcohol use: Not Currently     Drug use: Never     Sexual activity: Not on file   Other Topics Concern     Not on file   Social History Narrative     Not on file     Social Determinants of Health     Financial Resource Strain: Not on file   Food Insecurity: Not on file   Transportation Needs: Not on file   Physical Activity: Not on file   Stress: Not on file   Social Connections: Not on file   Interpersonal Safety: Not At Risk (1/1/2024)    Received from [facility]    Intimate Partner Violence      Physically hurt, threatened and/or made to feel afraid: No   Housing Stability: Not on file      SUBJECTIVE  Cristobal is doing good. He does not have any troubles eating anymore. The food goes down, does not get stuck and he does not vomit all over after he eats. He does have some reflux and is still on the omeprazole. He denies pain or shortness of breath.     OBJECTIVE  /83 (BP Location: Right arm, Patient Position: Sitting, Cuff Size: Adult Regular)   Pulse 56   Temp 97.9  F (36.6  C) (Oral)   Resp 16   Wt 73.5 kg (162 lb 1.6 oz)   SpO2 96%   BMI 25.39 kg/m       His incisions are healed nicely.    From a personal perspective, he is excited to be able to meet up with his friends for lunch this Thursday. Prior to surgery to fix the hernia, he did not enjoy going to these lunches because he would throw up after eating.    GOVIND Jain is a 74 year-old male status post robot assisted laparoscopic hiatal hernia repair, gastropexy and chest tube placement. He is here for post operative follow up.      PLAN  I spent 25 min on the date of the encounter in chart review, patient visit, review of tests, documentation and/or discussion with other providers about the issues documented above. I reviewed the plan as follows:  Follow  up with me in 1 year with an esophagram prior. Continue taking omeprazole.  Necessary Tests & Appointments: esophagram  All questions were answered and the patient and present family were in agreement with the plan.  I appreciate the opportunity to participate in the care of your patient and will keep you updated.  Sincerely,      Again, thank you for allowing me to participate in the care of your patient.        Sincerely,        CHAVA Corona CNS

## 2024-09-09 NOTE — NURSING NOTE
"Oncology Rooming Note    September 9, 2024 3:06 PM   Flaco Hickman is a 74 year old male who presents for:    Chief Complaint   Patient presents with    Oncology Clinic Visit     Post op Hernia repair     Initial Vitals: /83 (BP Location: Right arm, Patient Position: Sitting, Cuff Size: Adult Regular)   Pulse 56   Temp 97.9  F (36.6  C) (Oral)   Resp 16   Wt 73.5 kg (162 lb 1.6 oz)   SpO2 96%   BMI 25.39 kg/m   Estimated body mass index is 25.39 kg/m  as calculated from the following:    Height as of 8/16/24: 1.702 m (5' 7\").    Weight as of this encounter: 73.5 kg (162 lb 1.6 oz). Body surface area is 1.86 meters squared.  No Pain (0) Comment: Data Unavailable   No LMP for male patient.  Allergies reviewed: Yes  Medications reviewed: Yes    Medications: Medication refills not needed today.  Pharmacy name entered into [facility]: [facility] - [address]    Frailty Screening:   Is the patient here for a new oncology consult visit in cancer care? 2. No      Clinical concerns: None       Emma Veras LPN  9/9/2024              "

## 2024-10-09 DIAGNOSIS — K21.9 HIATAL HERNIA WITH GASTROESOPHAGEAL REFLUX: Primary | ICD-10-CM

## 2024-10-09 DIAGNOSIS — K44.9 HIATAL HERNIA WITH GASTROESOPHAGEAL REFLUX: Primary | ICD-10-CM

## 2024-10-09 RX ORDER — OMEPRAZOLE 40 MG/1
40 CAPSULE, DELAYED RELEASE ORAL DAILY
Qty: 30 CAPSULE | Refills: 1 | Status: SHIPPED | OUTPATIENT
Start: 2024-10-09

## 2025-01-30 DIAGNOSIS — K21.9 HIATAL HERNIA WITH GASTROESOPHAGEAL REFLUX: ICD-10-CM

## 2025-01-30 DIAGNOSIS — K44.9 HIATAL HERNIA WITH GASTROESOPHAGEAL REFLUX: ICD-10-CM

## 2025-01-30 RX ORDER — OMEPRAZOLE 40 MG/1
40 CAPSULE, DELAYED RELEASE ORAL DAILY
Qty: 30 CAPSULE | Refills: 11 | Status: SHIPPED | OUTPATIENT
Start: 2025-01-30

## 2025-01-30 NOTE — TELEPHONE ENCOUNTER
Medication requested: omeprazole 40 mg DR capsule    Last prescribing provider: Shannan Francis CNP    Last clinic visit date: 9/9/24 with Shannan Francis CNP    Recommendations for requested medication (if none, N/A): NA    Any other pertinent information (if none, N/A): NA    Refilled: Y/N, if NO, why?    Pended and Routed to Shannan Francis CNP

## 2025-06-08 ENCOUNTER — HEALTH MAINTENANCE LETTER (OUTPATIENT)
Age: 75
End: 2025-06-08

## 2025-07-08 DIAGNOSIS — K44.9 HIATAL HERNIA WITH GASTROESOPHAGEAL REFLUX: Primary | ICD-10-CM

## 2025-07-08 DIAGNOSIS — K21.9 HIATAL HERNIA WITH GASTROESOPHAGEAL REFLUX: Primary | ICD-10-CM

## 2025-08-21 ENCOUNTER — TRANSCRIBE ORDERS (OUTPATIENT)
Dept: OTHER | Age: 75
End: 2025-08-21

## 2025-08-21 DIAGNOSIS — R13.10 DYSPHAGIA: Primary | ICD-10-CM

## 2025-11-24 ENCOUNTER — PRE VISIT (OUTPATIENT)
Dept: GASTROENTEROLOGY | Facility: CLINIC | Age: 75
End: 2025-11-24

## (undated) DEVICE — ESU GROUND PAD ADULT REM W/15' CORD E7507DB

## (undated) DEVICE — ESU PENCIL W/ROCKER SWITCH BLADE HOLSTER E2350HDB

## (undated) DEVICE — ENDO TROCAR CONMED AIRSEAL BLADELESS 12X120MM IAS12-120LP

## (undated) DEVICE — SYR 03ML LL W/O NDL

## (undated) DEVICE — ESU EYE HIGH TEMP 65410-183

## (undated) DEVICE — DRSG PRIMAPORE 02X3" 7133

## (undated) DEVICE — TIES BANDING T50R

## (undated) DEVICE — GLOVE PROTEXIS W/NEU-THERA 7.5  2D73TE75

## (undated) DEVICE — Device

## (undated) DEVICE — SU ETHILON 5-0 P-3 18" CLEAR 690

## (undated) DEVICE — ANTIFOG SOLUTION SEE SHARP 150M TROCAR SWABS 30978 (COI)

## (undated) DEVICE — SU MONOCRYL 4-0 P-3 18" UND Y494G

## (undated) DEVICE — KIT ENDO FIRST STEP DISINFECTANT 200ML W/POUCH EP-4

## (undated) DEVICE — ENDO TROCAR FIRST ENTRY KII FIOS Z-THRD 05X100MM CTF03

## (undated) DEVICE — ADH LIQUID MASTISOL TOPICAL VIAL 2-3ML 0523-48

## (undated) DEVICE — DAVINCI XI OBTURATOR BLADELESS 8MM 470359

## (undated) DEVICE — SOL WATER IRRIG 1000ML BOTTLE 07139-09

## (undated) DEVICE — TUBING CONMED AIRSEAL SMOKE EVAC INSUFFLATION ASM-EVAC

## (undated) DEVICE — DECANTER VIAL 2006S

## (undated) DEVICE — PREP CHLORAPREP 26ML TINTED ORANGE  260815

## (undated) DEVICE — KIT CONNECTOR FOR OLYMPUS ENDOSCOPES DEFENDO 100310

## (undated) DEVICE — NDL 30GA 1" 305128

## (undated) DEVICE — ENDO TUBING CO2 SMARTCAP STERILE DISP 100145CO2EXT

## (undated) DEVICE — SU SILK 0 SH 30" K834H

## (undated) DEVICE — WIPES FOLEY CARE SURESTEP PROVON DFC100

## (undated) DEVICE — SU VICRYL 2-0 SH 27" UND J417H

## (undated) DEVICE — DRSG STERI STRIP 1/2X4" R1547

## (undated) DEVICE — SU SILK 0 TIE 6X30" A306H

## (undated) DEVICE — DRAPE SHEET MED 44X70" 9355

## (undated) DEVICE — SU PLEDGET SOFT TFE 13MMX7MMX1.5MM D7044

## (undated) DEVICE — NDL INSUFFLATION 13GA 120MM C2201

## (undated) DEVICE — SOL NACL 0.9% IRRIG 1000ML BOTTLE 2F7124

## (undated) DEVICE — DAVINCI XI FCP CADIERE 8MM ENDOWRIST 471049

## (undated) DEVICE — SU VICRYL+ 0 27 UR6 VLT VCP603H

## (undated) DEVICE — SUCTION MANIFOLD NEPTUNE 2 SYS 4 PORT 0702-020-000

## (undated) DEVICE — CATH TRAY FOLEY SURESTEP 16FR W/URINE MTR STATLK LF A303416A

## (undated) DEVICE — DRAPE IOBAN INCISE 23X17" 6650EZ

## (undated) DEVICE — PACK MINOR EYE

## (undated) DEVICE — CUP AND LID 2PK 2OZ STERILE  SSK9006A

## (undated) DEVICE — DAVINCI XI TISSUE SEALER SYNCROSEAL 8MM 480440

## (undated) DEVICE — SOL WATER IRRIG 1000ML BOTTLE 2F7114

## (undated) DEVICE — LINEN TOWEL PACK X5 5464

## (undated) DEVICE — ENDO DISSECTOR KITTNER CIGARETTE ROLL4"X4" 15505/25

## (undated) DEVICE — TUBING SUCTION 10'X3/16" N510

## (undated) DEVICE — DAVINCI XI SEAL UNIVERSAL 5-12MM 470500

## (undated) DEVICE — SU MONOCRYL 4-0 PS-2 27" UND Y426H

## (undated) DEVICE — DRAIN JACKSON PRATT ROUND W/TROCAR 19FR JP-HUR195

## (undated) DEVICE — PREP CHLORAPREP 26ML TINTED HI-LITE ORANGE 930815

## (undated) DEVICE — GLOVE BIOGEL PI MICRO SZ 7.5 48575

## (undated) DEVICE — DAVINCI XI DRAPE ARM 470015

## (undated) DEVICE — DAVINCI XI DRAPE COLUMN 470341

## (undated) DEVICE — LINEN TOWEL PACK X6 WHITE 5487

## (undated) DEVICE — SU PLAIN 6-0 G-1DA 18" 770G

## (undated) DEVICE — CONNECTOR BLAKE DRAIN SGL BCC1

## (undated) DEVICE — TRAY PNEUMOTHORAX G12032 C-UTPTY-1400-WAYNE-112497

## (undated) DEVICE — SURGICEL ABSORBABLE HEMOSTAT SNOW 4"X4" 2083

## (undated) DEVICE — POUCH TISSUE RETRIEVAL ROBOTIC 12MM 5.5" INTRO TRS-ROBO-12

## (undated) DEVICE — SU SILK 2-0 SH 30" K833H

## (undated) RX ORDER — FENTANYL CITRATE-0.9 % NACL/PF 10 MCG/ML
PLASTIC BAG, INJECTION (ML) INTRAVENOUS
Status: DISPENSED
Start: 2024-08-16

## (undated) RX ORDER — HYDROMORPHONE HCL IN WATER/PF 6 MG/30 ML
PATIENT CONTROLLED ANALGESIA SYRINGE INTRAVENOUS
Status: DISPENSED
Start: 2024-08-16

## (undated) RX ORDER — FENTANYL CITRATE 50 UG/ML
INJECTION, SOLUTION INTRAMUSCULAR; INTRAVENOUS
Status: DISPENSED
Start: 2024-08-16

## (undated) RX ORDER — BUPIVACAINE HYDROCHLORIDE AND EPINEPHRINE 2.5; 5 MG/ML; UG/ML
INJECTION, SOLUTION EPIDURAL; INFILTRATION; INTRACAUDAL; PERINEURAL
Status: DISPENSED
Start: 2024-08-16

## (undated) RX ORDER — LIDOCAINE HYDROCHLORIDE 20 MG/ML
JELLY TOPICAL
Status: DISPENSED
Start: 2024-05-02

## (undated) RX ORDER — SODIUM CHLORIDE, SODIUM LACTATE, POTASSIUM CHLORIDE, CALCIUM CHLORIDE 600; 310; 30; 20 MG/100ML; MG/100ML; MG/100ML; MG/100ML
INJECTION, SOLUTION INTRAVENOUS
Status: DISPENSED
Start: 2024-08-16

## (undated) RX ORDER — ENOXAPARIN SODIUM 100 MG/ML
INJECTION SUBCUTANEOUS
Status: DISPENSED
Start: 2024-08-16

## (undated) RX ORDER — CEFAZOLIN SODIUM/WATER 2 G/20 ML
SYRINGE (ML) INTRAVENOUS
Status: DISPENSED
Start: 2024-08-16

## (undated) RX ORDER — ACETAMINOPHEN 325 MG/1
TABLET ORAL
Status: DISPENSED
Start: 2021-11-15

## (undated) RX ORDER — HYDROMORPHONE HYDROCHLORIDE 1 MG/ML
INJECTION, SOLUTION INTRAMUSCULAR; INTRAVENOUS; SUBCUTANEOUS
Status: DISPENSED
Start: 2024-08-16

## (undated) RX ORDER — FENTANYL CITRATE 50 UG/ML
INJECTION, SOLUTION INTRAMUSCULAR; INTRAVENOUS
Status: DISPENSED
Start: 2021-11-15

## (undated) RX ORDER — CEFAZOLIN SODIUM 1 G/3ML
INJECTION, POWDER, FOR SOLUTION INTRAMUSCULAR; INTRAVENOUS
Status: DISPENSED
Start: 2024-08-16

## (undated) RX ORDER — NEOSTIGMINE METHYLSULFATE 1 MG/ML
VIAL (ML) INJECTION
Status: DISPENSED
Start: 2024-08-16

## (undated) RX ORDER — EPHEDRINE SULFATE 50 MG/ML
INJECTION, SOLUTION INTRAMUSCULAR; INTRAVENOUS; SUBCUTANEOUS
Status: DISPENSED
Start: 2024-08-16

## (undated) RX ORDER — GLYCOPYRROLATE 0.2 MG/ML
INJECTION, SOLUTION INTRAMUSCULAR; INTRAVENOUS
Status: DISPENSED
Start: 2024-08-16